# Patient Record
Sex: MALE | Race: WHITE | Employment: OTHER | URBAN - METROPOLITAN AREA
[De-identification: names, ages, dates, MRNs, and addresses within clinical notes are randomized per-mention and may not be internally consistent; named-entity substitution may affect disease eponyms.]

---

## 2017-03-10 ENCOUNTER — OFFICE VISIT (OUTPATIENT)
Dept: INTERNAL MEDICINE CLINIC | Facility: CLINIC | Age: 82
End: 2017-03-10

## 2017-03-10 VITALS
BODY MASS INDEX: 30.53 KG/M2 | DIASTOLIC BLOOD PRESSURE: 85 MMHG | HEIGHT: 66 IN | OXYGEN SATURATION: 98 % | WEIGHT: 190 LBS | TEMPERATURE: 98 F | SYSTOLIC BLOOD PRESSURE: 135 MMHG | HEART RATE: 57 BPM

## 2017-03-10 DIAGNOSIS — R05.9 COUGH: ICD-10-CM

## 2017-03-10 DIAGNOSIS — J20.9 ACUTE BRONCHITIS, UNSPECIFIED ORGANISM: Primary | ICD-10-CM

## 2017-03-10 PROCEDURE — 99213 OFFICE O/P EST LOW 20 MIN: CPT | Performed by: INTERNAL MEDICINE

## 2017-03-10 PROCEDURE — G0463 HOSPITAL OUTPT CLINIC VISIT: HCPCS | Performed by: INTERNAL MEDICINE

## 2017-03-10 RX ORDER — AZITHROMYCIN 250 MG/1
TABLET, FILM COATED ORAL
Qty: 6 TABLET | Refills: 0 | Status: SHIPPED | OUTPATIENT
Start: 2017-03-10 | End: 2017-08-08 | Stop reason: ALTCHOICE

## 2017-03-10 RX ORDER — BENZONATATE 100 MG/1
100 CAPSULE ORAL 3 TIMES DAILY PRN
Qty: 20 CAPSULE | Refills: 0 | Status: SHIPPED | OUTPATIENT
Start: 2017-03-10 | End: 2017-03-17

## 2017-03-10 NOTE — PROGRESS NOTES
HPI:    Patient ID: Carmen Simms is a 80year old male. HPI  Pt comes today with daughter with complaint of cough and phlegm for few days, no fever no chills, no sob, just hard time sleeping due to cough.      Review of Systems   Constitutional: Negative daily. Disp:  Rfl:    carvedilol (COREG) 6.25 MG Oral Tab Take  by mouth. Disp:  Rfl:    lisinopril (PRINIVIL,ZESTRIL) 40 MG Oral Tab Take  by mouth. Disp:  Rfl:    simvastatin (ZOCOR) 20 MG Oral Tab Take  by mouth.  Disp:  Rfl:      Allergies:No Known Dimas normal.   Nursing note and vitals reviewed.            ASSESSMENT/PLAN:   Acute bronchitis, unspecified organism  (primary encounter diagnosis)- will cover with ab, take as prescribed with food, let us know if not better or if worst   Cough- will add cough

## 2017-08-08 ENCOUNTER — OFFICE VISIT (OUTPATIENT)
Dept: INTERNAL MEDICINE CLINIC | Facility: CLINIC | Age: 82
End: 2017-08-08

## 2017-08-08 VITALS
WEIGHT: 189 LBS | BODY MASS INDEX: 30.37 KG/M2 | HEIGHT: 66 IN | TEMPERATURE: 98 F | SYSTOLIC BLOOD PRESSURE: 100 MMHG | OXYGEN SATURATION: 96 % | HEART RATE: 62 BPM | DIASTOLIC BLOOD PRESSURE: 60 MMHG

## 2017-08-08 DIAGNOSIS — J20.9 ACUTE BRONCHITIS, UNSPECIFIED ORGANISM: Primary | ICD-10-CM

## 2017-08-08 DIAGNOSIS — I95.9 HYPOTENSION, UNSPECIFIED HYPOTENSION TYPE: ICD-10-CM

## 2017-08-08 DIAGNOSIS — R05.9 COUGH: ICD-10-CM

## 2017-08-08 PROCEDURE — 99214 OFFICE O/P EST MOD 30 MIN: CPT | Performed by: INTERNAL MEDICINE

## 2017-08-08 PROCEDURE — G0463 HOSPITAL OUTPT CLINIC VISIT: HCPCS | Performed by: INTERNAL MEDICINE

## 2017-08-08 RX ORDER — BENZONATATE 100 MG/1
100 CAPSULE ORAL 3 TIMES DAILY PRN
Qty: 20 CAPSULE | Refills: 0 | Status: SHIPPED | OUTPATIENT
Start: 2017-08-08 | End: 2017-08-15

## 2017-08-08 RX ORDER — AMOXICILLIN AND CLAVULANATE POTASSIUM 875; 125 MG/1; MG/1
1 TABLET, FILM COATED ORAL 2 TIMES DAILY
Qty: 20 TABLET | Refills: 0 | Status: SHIPPED | OUTPATIENT
Start: 2017-08-08 | End: 2017-08-18

## 2017-08-08 NOTE — PATIENT INSTRUCTIONS
ASSESSMENT/PLAN:   Acute bronchitis, unspecified organism  (primary encounter diagnosis)-we will treat with antibiotic take as prescribed drink plenty of fluid  Cough will give cough medicine also   Hypotension, unspecified hypotension typemost likely due

## 2017-08-08 NOTE — PROGRESS NOTES
HPI:    Patient ID: Dinora Munoz is a 80year old male. HPI   Comes in today with complaint of cough with phlegm mild shortness of breath with coughing feeling tired for about 5 days or so. Grandchildren was sick and thinks he got sick from them.     Re aspirin (ECOTRIN) 325 MG Oral Tab EC Take 325 mg by mouth daily. Disp:  Rfl:    carvedilol (COREG) 6.25 MG Oral Tab Take  by mouth. Disp:  Rfl:    lisinopril (PRINIVIL,ZESTRIL) 40 MG Oral Tab Take  by mouth.  Disp:  Rfl:    simvastatin (ZOCOR) 20 MG Oral He has a normal mood and affect. His behavior is normal. Judgment and thought content normal.   Nursing note and vitals reviewed.            ASSESSMENT/PLAN:   Acute bronchitis, unspecified organism  (primary encounter diagnosis)-we will treat with antibiot

## 2017-08-09 ENCOUNTER — TELEPHONE (OUTPATIENT)
Dept: INTERNAL MEDICINE CLINIC | Facility: CLINIC | Age: 82
End: 2017-08-09

## 2017-08-09 NOTE — TELEPHONE ENCOUNTER
Patient was seen yesterday daughter is asking if ok for patient to travel to [place] Monday 8-14 for 3 days it is a 4 hour drive. Patient also went on a 50 minute walk today and felt good just a little tired.

## 2017-08-09 NOTE — TELEPHONE ENCOUNTER
Daughter Tamra Kumari notified ok to go on drive to [place], should get out at least in 2 hours to stretch and walk around. Daughter agreed.

## 2017-11-14 ENCOUNTER — PRIOR ORIGINAL RECORDS (OUTPATIENT)
Dept: OTHER | Age: 82
End: 2017-11-14

## 2017-11-22 ENCOUNTER — LAB ENCOUNTER (OUTPATIENT)
Dept: LAB | Age: 82
End: 2017-11-22
Attending: INTERNAL MEDICINE
Payer: MEDICARE

## 2017-11-22 ENCOUNTER — PRIOR ORIGINAL RECORDS (OUTPATIENT)
Dept: OTHER | Age: 82
End: 2017-11-22

## 2017-11-22 DIAGNOSIS — E78.00 PURE HYPERCHOLESTEROLEMIA: ICD-10-CM

## 2017-11-22 DIAGNOSIS — I10 ESSENTIAL HYPERTENSION, MALIGNANT: Primary | ICD-10-CM

## 2017-11-22 LAB
ALT (SGPT): 14 U/L
AST (SGOT): 20 U/L
CHOLESTEROL, TOTAL: 162 MG/DL
HDL CHOLESTEROL: 52 MG/DL
LDL CHOLESTEROL: 90 MG/DL
TRIGLYCERIDES: 99 MG/DL

## 2017-11-22 PROCEDURE — 84460 ALANINE AMINO (ALT) (SGPT): CPT

## 2017-11-22 PROCEDURE — 36415 COLL VENOUS BLD VENIPUNCTURE: CPT

## 2017-11-22 PROCEDURE — 80061 LIPID PANEL: CPT

## 2017-11-22 PROCEDURE — 84450 TRANSFERASE (AST) (SGOT): CPT

## 2018-01-23 ENCOUNTER — TELEPHONE (OUTPATIENT)
Dept: INTERNAL MEDICINE CLINIC | Facility: CLINIC | Age: 83
End: 2018-01-23

## 2018-01-23 NOTE — TELEPHONE ENCOUNTER
Pt's daughter contacts clinic requesting appt. Pt felloout of bed 1 week ago and landed on his side. C/o rib/side/back pain. It is not severe but is ongoing. Denies CP, SOB or difficulty breathing. Pt is ambulating.   Acute visit booked 1/24, advised I

## 2018-01-24 ENCOUNTER — OFFICE VISIT (OUTPATIENT)
Dept: INTERNAL MEDICINE CLINIC | Facility: CLINIC | Age: 83
End: 2018-01-24

## 2018-01-24 ENCOUNTER — HOSPITAL ENCOUNTER (OUTPATIENT)
Dept: GENERAL RADIOLOGY | Facility: HOSPITAL | Age: 83
Discharge: HOME | End: 2018-01-24
Attending: INTERNAL MEDICINE
Payer: MEDICARE

## 2018-01-24 ENCOUNTER — HOSPITAL ENCOUNTER (OUTPATIENT)
Dept: GENERAL RADIOLOGY | Facility: HOSPITAL | Age: 83
Discharge: HOME | End: 2018-01-24
Attending: INTERNAL MEDICINE | Admitting: INTERNAL MEDICINE
Payer: MEDICARE

## 2018-01-24 ENCOUNTER — TELEPHONE (OUTPATIENT)
Dept: INTERNAL MEDICINE CLINIC | Facility: CLINIC | Age: 83
End: 2018-01-24

## 2018-01-24 VITALS
BODY MASS INDEX: 31.34 KG/M2 | TEMPERATURE: 99 F | SYSTOLIC BLOOD PRESSURE: 124 MMHG | WEIGHT: 195 LBS | HEIGHT: 66 IN | HEART RATE: 74 BPM | DIASTOLIC BLOOD PRESSURE: 70 MMHG | RESPIRATION RATE: 18 BRPM

## 2018-01-24 DIAGNOSIS — R07.81 RIB PAIN ON LEFT SIDE: ICD-10-CM

## 2018-01-24 DIAGNOSIS — R52 PAIN: ICD-10-CM

## 2018-01-24 DIAGNOSIS — R07.81 RIB PAIN ON LEFT SIDE: Primary | ICD-10-CM

## 2018-01-24 PROCEDURE — 71101 X-RAY EXAM UNILAT RIBS/CHEST: CPT | Performed by: INTERNAL MEDICINE

## 2018-01-24 PROCEDURE — 99213 OFFICE O/P EST LOW 20 MIN: CPT | Performed by: INTERNAL MEDICINE

## 2018-01-24 PROCEDURE — G0463 HOSPITAL OUTPT CLINIC VISIT: HCPCS | Performed by: INTERNAL MEDICINE

## 2018-01-24 PROCEDURE — 73010 X-RAY EXAM OF SHOULDER BLADE: CPT | Performed by: INTERNAL MEDICINE

## 2018-01-24 RX ORDER — ATORVASTATIN CALCIUM 20 MG/1
TABLET, FILM COATED ORAL
Refills: 3 | Status: ON HOLD | COMMUNITY
Start: 2018-01-15 | End: 2020-01-01

## 2018-01-24 RX ORDER — ACETAMINOPHEN AND CODEINE PHOSPHATE 300; 30 MG/1; MG/1
1 TABLET ORAL EVERY 4 HOURS PRN
Qty: 30 TABLET | Refills: 0 | Status: SHIPPED | OUTPATIENT
Start: 2018-01-24 | End: 2018-10-23 | Stop reason: ALTCHOICE

## 2018-01-24 NOTE — TELEPHONE ENCOUNTER
Patient called yesterday for patient feel 1 week ago and he has  Rib pain and back he thought the nurse put him on the schedule at 5 pm today can you see him or advice him to go to er . Patient and daughter waiting in the waiting room

## 2018-01-24 NOTE — PROGRESS NOTES
HPI:    Patient ID: Yehuda Mancera is a 80year old male. HPI to me complaining of the left-sided rib pain. According to him started 1 week ago after fall , from his bed he landed on his left side.   He states that the pain is getting worse now, he feels Psychiatric/Behavioral: Negative for agitation, behavioral problems, confusion, hallucinations and sleep disturbance. The patient is not nervous/anxious.             Current Outpatient Prescriptions:  atorvastatin 20 MG Oral Tab  Disp:  Rfl: 3   Acetaminoph Physical Exam   Musculoskeletal:        Left shoulder: He exhibits decreased range of motion.  He exhibits no tenderness, no bony tenderness, no swelling, no effusion, no crepitus, no deformity, no laceration, no pain, no spasm, normal pulse and normal str

## 2018-01-25 ENCOUNTER — HOSPITAL ENCOUNTER (OUTPATIENT)
Dept: GENERAL RADIOLOGY | Age: 83
Discharge: HOME | End: 2018-01-25
Attending: INTERNAL MEDICINE
Payer: MEDICARE

## 2018-01-25 ENCOUNTER — TELEPHONE (OUTPATIENT)
Dept: INTERNAL MEDICINE CLINIC | Facility: CLINIC | Age: 83
End: 2018-01-25
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
## 2018-01-25 PROCEDURE — 73030 X-RAY EXAM OF SHOULDER: CPT | Performed by: INTERNAL MEDICINE

## 2018-01-25 NOTE — TELEPHONE ENCOUNTER
Pt daughter states pt left shoulder feels a bit better, but still in pain 8/10. Discussed with Dr. Savannah Ayers directly, per Dr. Savannah Ayers pt should have Xr of shoulder done. Provided daughter with central scheduling phone number provided.  No further

## 2018-01-25 NOTE — PATIENT INSTRUCTIONS
Rib pain on left side  (primary encounter diagnosis) post fall most likely rib  Fracture, we will give Tylenol with codeine as the pain is severe, discussed about side effects, incentive spirometry 10 times within an hour to prevent atelectasis ,if not bet

## 2018-03-18 ENCOUNTER — APPOINTMENT (OUTPATIENT)
Dept: CT IMAGING | Facility: HOSPITAL | Age: 83
End: 2018-03-18
Attending: EMERGENCY MEDICINE
Payer: MEDICARE

## 2018-03-18 ENCOUNTER — HOSPITAL ENCOUNTER (EMERGENCY)
Facility: HOSPITAL | Age: 83
Discharge: HOME | End: 2018-03-18
Attending: EMERGENCY MEDICINE
Payer: MEDICARE

## 2018-03-18 VITALS
SYSTOLIC BLOOD PRESSURE: 168 MMHG | TEMPERATURE: 97 F | DIASTOLIC BLOOD PRESSURE: 88 MMHG | OXYGEN SATURATION: 99 % | RESPIRATION RATE: 18 BRPM | HEART RATE: 61 BPM

## 2018-03-18 DIAGNOSIS — K02.9 PAIN DUE TO DENTAL CARIES: ICD-10-CM

## 2018-03-18 DIAGNOSIS — R42 DIZZINESS: Primary | ICD-10-CM

## 2018-03-18 LAB
ANION GAP SERPL CALC-SCNC: 8 MMOL/L (ref 0–18)
BASOPHILS # BLD: 0.1 K/UL (ref 0–0.2)
BASOPHILS NFR BLD: 1 %
BUN SERPL-MCNC: 13 MG/DL (ref 8–20)
BUN/CREAT SERPL: 13.5 (ref 10–20)
CALCIUM SERPL-MCNC: 9.1 MG/DL (ref 8.5–10.5)
CHLORIDE SERPL-SCNC: 101 MMOL/L (ref 95–110)
CO2 SERPL-SCNC: 27 MMOL/L (ref 22–32)
CREAT SERPL-MCNC: 0.96 MG/DL (ref 0.5–1.5)
EOSINOPHIL # BLD: 0.2 K/UL (ref 0–0.7)
EOSINOPHIL NFR BLD: 2 %
ERYTHROCYTE [DISTWIDTH] IN BLOOD BY AUTOMATED COUNT: 13.8 % (ref 11–15)
GLUCOSE SERPL-MCNC: 144 MG/DL (ref 70–99)
HCT VFR BLD AUTO: 42.1 % (ref 41–52)
HGB BLD-MCNC: 14.5 G/DL (ref 13.5–17.5)
LYMPHOCYTES # BLD: 1.8 K/UL (ref 1–4)
LYMPHOCYTES NFR BLD: 23 %
MCH RBC QN AUTO: 32.4 PG (ref 27–32)
MCHC RBC AUTO-ENTMCNC: 34.6 G/DL (ref 32–37)
MCV RBC AUTO: 93.7 FL (ref 80–100)
MONOCYTES # BLD: 0.6 K/UL (ref 0–1)
MONOCYTES NFR BLD: 8 %
NEUTROPHILS # BLD AUTO: 5.1 K/UL (ref 1.8–7.7)
NEUTROPHILS NFR BLD: 66 %
OSMOLALITY UR CALC.SUM OF ELEC: 285 MOSM/KG (ref 275–295)
PLATELET # BLD AUTO: 132 K/UL (ref 140–400)
PMV BLD AUTO: 10 FL (ref 7.4–10.3)
POTASSIUM SERPL-SCNC: 4.6 MMOL/L (ref 3.3–5.1)
RBC # BLD AUTO: 4.49 M/UL (ref 4.5–5.9)
SODIUM SERPL-SCNC: 136 MMOL/L (ref 136–144)
TROPONIN I SERPL-MCNC: 0.03 NG/ML (ref ?–0.03)
WBC # BLD AUTO: 7.8 K/UL (ref 4–11)

## 2018-03-18 PROCEDURE — 70450 CT HEAD/BRAIN W/O DYE: CPT | Performed by: EMERGENCY MEDICINE

## 2018-03-18 PROCEDURE — 36415 COLL VENOUS BLD VENIPUNCTURE: CPT

## 2018-03-18 PROCEDURE — 84484 ASSAY OF TROPONIN QUANT: CPT | Performed by: EMERGENCY MEDICINE

## 2018-03-18 PROCEDURE — 93010 ELECTROCARDIOGRAM REPORT: CPT | Performed by: EMERGENCY MEDICINE

## 2018-03-18 PROCEDURE — 80048 BASIC METABOLIC PNL TOTAL CA: CPT | Performed by: EMERGENCY MEDICINE

## 2018-03-18 PROCEDURE — 99285 EMERGENCY DEPT VISIT HI MDM: CPT

## 2018-03-18 PROCEDURE — 93005 ELECTROCARDIOGRAM TRACING: CPT

## 2018-03-18 PROCEDURE — 85025 COMPLETE CBC W/AUTO DIFF WBC: CPT | Performed by: EMERGENCY MEDICINE

## 2018-03-18 NOTE — ED PROVIDER NOTES
Patient Seen in: [facility] AND [facility] Emergency Department    History   Patient presents with:  Dizziness (neurologic)    Stated Complaint: dizziness    HPI    Patient presents with feeling dizzy.   He states when he is walking he feels a bit lightheaded bit Diabetes Mother    • Breast Cancer Daughter        Smoking status: Never Smoker                                                              Smokeless tobacco: Never Used                      Alcohol use: Yes           1.2 oz/week     Glasses of wine: 2 pe tone.  Skin:  Warm, dry, well perfused. Good skin turgor. No rashes seen. Neurology:  Moving all extremities equally with good coordination. No cranial nerve asymmetry noted.   The pupils are equal reactive extract movements intact no nystagmus noted  P CULTURE REFLEX   RAINBOW DRAW BLUE   RAINBOW DRAW LAVENDER   RAINBOW DRAW DARK GREEN   RAINBOW DRAW LIGHT GREEN   RAINBOW DRAW GOLD   RAINBOW DRAW LAVENDER TALL (BNP)     EKG    Rate, intervals and axes as noted on EKG Report.   Rate: EKG shows rate 61 norm

## 2018-07-12 ENCOUNTER — PRIOR ORIGINAL RECORDS (OUTPATIENT)
Dept: OTHER | Age: 83
End: 2018-07-12

## 2018-07-12 ENCOUNTER — LAB ENCOUNTER (OUTPATIENT)
Dept: LAB | Age: 83
End: 2018-07-12
Attending: INTERNAL MEDICINE
Payer: MEDICARE

## 2018-07-12 DIAGNOSIS — E78.00 PURE HYPERCHOLESTEROLEMIA: Primary | ICD-10-CM

## 2018-07-12 LAB
ALT SERPL-CCNC: 20 U/L (ref 17–63)
AST SERPL-CCNC: 24 U/L (ref 15–41)
CHOLEST SERPL-MCNC: 120 MG/DL (ref 110–200)
HDLC SERPL-MCNC: 46 MG/DL
LDLC SERPL CALC-MCNC: 55 MG/DL (ref 0–99)
NONHDLC SERPL-MCNC: 74 MG/DL
TRIGL SERPL-MCNC: 97 MG/DL (ref 1–149)

## 2018-07-12 PROCEDURE — 84450 TRANSFERASE (AST) (SGOT): CPT

## 2018-07-12 PROCEDURE — 84460 ALANINE AMINO (ALT) (SGPT): CPT

## 2018-07-12 PROCEDURE — 80061 LIPID PANEL: CPT

## 2018-07-12 PROCEDURE — 36415 COLL VENOUS BLD VENIPUNCTURE: CPT

## 2018-07-13 ENCOUNTER — PRIOR ORIGINAL RECORDS (OUTPATIENT)
Dept: OTHER | Age: 83
End: 2018-07-13

## 2018-07-13 LAB
ALT (SGPT): 20 U/L
AST (SGOT): 24 U/L
CHOLESTEROL, TOTAL: 120 MG/DL
HDL CHOLESTEROL: 46 MG/DL
LDL CHOLESTEROL: 55 MG/DL
NON-HDL CHOLESTEROL: 74 MG/DL
TRIGLYCERIDES: 97 MG/DL

## 2018-10-20 ENCOUNTER — HOSPITAL ENCOUNTER (EMERGENCY)
Facility: HOSPITAL | Age: 83
Discharge: HOME | End: 2018-10-20
Payer: MEDICARE

## 2018-10-20 ENCOUNTER — APPOINTMENT (OUTPATIENT)
Dept: CT IMAGING | Facility: HOSPITAL | Age: 83
End: 2018-10-20
Payer: MEDICARE

## 2018-10-20 VITALS
SYSTOLIC BLOOD PRESSURE: 163 MMHG | DIASTOLIC BLOOD PRESSURE: 82 MMHG | TEMPERATURE: 98 F | HEART RATE: 68 BPM | OXYGEN SATURATION: 97 % | RESPIRATION RATE: 18 BRPM

## 2018-10-20 DIAGNOSIS — S01.511A LIP LACERATION, INITIAL ENCOUNTER: Primary | ICD-10-CM

## 2018-10-20 DIAGNOSIS — S09.93XA DENTAL INJURY, INITIAL ENCOUNTER: ICD-10-CM

## 2018-10-20 DIAGNOSIS — S03.2XXA TOOTH AVULSION, INITIAL ENCOUNTER: ICD-10-CM
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
## 2018-10-20 PROCEDURE — 70450 CT HEAD/BRAIN W/O DYE: CPT

## 2018-10-20 PROCEDURE — 12011 RPR F/E/E/N/L/M 2.5 CM/<: CPT

## 2018-10-20 PROCEDURE — 93005 ELECTROCARDIOGRAM TRACING: CPT

## 2018-10-20 PROCEDURE — 93010 ELECTROCARDIOGRAM REPORT: CPT | Performed by: INTERNAL MEDICINE

## 2018-10-20 PROCEDURE — 99284 EMERGENCY DEPT VISIT MOD MDM: CPT

## 2018-10-20 NOTE — ED PROVIDER NOTES
Patient Seen in: [facility] AND [facility] Emergency Department    History   Patient presents with:  Fall (musculoskeletal, neurologic)    Stated Complaint: fell, hit mouth    HPI    Patient is a 80-year-old male with some mild dementia who lives with his daught Physical Exam   Constitutional: He is oriented to person, place, and time. He appears well-developed and well-nourished. He is cooperative. HENT:   Head: Normocephalic.  Head is with laceration (Left upper lip with abrasion swelling and inner lip la Reading: LVH, no ST elevation              Laceration repair:    Verbal consent was obtained from the patient. The 2.5 cm laceration located inner upper lip was anesthetized in the usual fashion with 1% lidocaine without epi.  The wound was scrubbed, drape

## 2018-10-20 NOTE — ED NOTES
PT safe to DC home per MD. Alise Whitmore to dress self. DC teaching done, pt and daughter verbalize understanding. Ambulatory with steady gait to exit.

## 2018-10-23 ENCOUNTER — OFFICE VISIT (OUTPATIENT)
Dept: INTERNAL MEDICINE CLINIC | Facility: CLINIC | Age: 83
End: 2018-10-23
Payer: MEDICARE

## 2018-10-23 ENCOUNTER — MED REC SCAN ONLY (OUTPATIENT)
Dept: INTERNAL MEDICINE CLINIC | Facility: CLINIC | Age: 83
End: 2018-10-23

## 2018-10-23 VITALS
DIASTOLIC BLOOD PRESSURE: 68 MMHG | HEIGHT: 66 IN | HEART RATE: 52 BPM | SYSTOLIC BLOOD PRESSURE: 151 MMHG | BODY MASS INDEX: 29.89 KG/M2 | WEIGHT: 186 LBS

## 2018-10-23 DIAGNOSIS — Z23 NEED FOR INFLUENZA VACCINATION: ICD-10-CM

## 2018-10-23 DIAGNOSIS — I25.10 ATHEROSCLEROSIS OF NATIVE CORONARY ARTERY OF NATIVE HEART WITHOUT ANGINA PECTORIS: Primary | ICD-10-CM

## 2018-10-23 DIAGNOSIS — E78.00 HYPERCHOLESTEROLEMIA: ICD-10-CM

## 2018-10-23 DIAGNOSIS — I10 ESSENTIAL HYPERTENSION: ICD-10-CM
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
## 2018-10-23 PROCEDURE — 90653 IIV ADJUVANT VACCINE IM: CPT | Performed by: INTERNAL MEDICINE

## 2018-10-23 PROCEDURE — G0008 ADMIN INFLUENZA VIRUS VAC: HCPCS | Performed by: INTERNAL MEDICINE

## 2018-10-23 PROCEDURE — 99214 OFFICE O/P EST MOD 30 MIN: CPT | Performed by: INTERNAL MEDICINE

## 2018-10-23 NOTE — PATIENT INSTRUCTIONS
Take the medicines as prescribed. As we discussed please be cautious when you walk outside.   Avoid walking and slippery surface to prevent falls

## 2018-10-23 NOTE — PROGRESS NOTES
Niles Juarez is a 80year old male. Patient presents with:  ER F/U: pt fell down on Saturday  Imm/Inj: needs flu vax    HPI:   59-year-old [facility] male with a past medical history of coronary disease, hypertension, dyslipidemia here for evaluation after a f shortness of breath. Cardiovascular: Negative for chest pain. Gastrointestinal: Negative for vomiting, abdominal pain and abdominal distention. Genitourinary: Negative for hematuria. Skin: Negative for wound.    Psychiatric/Behavioral: Negative for influenza vaccination  -     FLU VACCINE ADJUVANT IM    Fall, subsequent encounter -discussed at length about fall precautions. Plan: Reviewed CT scan report and discussed the patient. I reviewed lab works and discussed with the patient.   Counseled mamie

## 2018-11-19 ENCOUNTER — IMAGING SERVICES (OUTPATIENT)
Dept: OTHER | Age: 83
End: 2018-11-19

## 2018-11-19 ENCOUNTER — HOSPITAL (OUTPATIENT)
Dept: OTHER | Age: 83
End: 2018-11-19
Attending: INTERNAL MEDICINE

## 2018-11-19 LAB
ANALYZER ANC (IANC): ABNORMAL
ANION GAP SERPL CALC-SCNC: 13 MMOL/L (ref 10–20)
APTT PPP: 24 SECONDS (ref 22–32)
APTT PPP: NORMAL S
BASOPHILS # BLD: 0 THOUSAND/MCL (ref 0–0.3)
BASOPHILS NFR BLD: 0 %
BUN SERPL-MCNC: 24 MG/DL (ref 6–20)
BUN/CREAT SERPL: 23 (ref 7–25)
CALCIUM SERPL-MCNC: 8.9 MG/DL (ref 8.4–10.2)
CHLORIDE: 106 MMOL/L (ref 98–107)
CO2 SERPL-SCNC: 26 MMOL/L (ref 21–32)
CREAT SERPL-MCNC: 1.06 MG/DL (ref 0.67–1.17)
DIFFERENTIAL METHOD BLD: ABNORMAL
EOSINOPHIL # BLD: 0.3 THOUSAND/MCL (ref 0.1–0.5)
EOSINOPHIL NFR BLD: 4 %
ERYTHROCYTE [DISTWIDTH] IN BLOOD: 13.1 % (ref 11–15)
GLUCOSE SERPL-MCNC: 192 MG/DL (ref 65–99)
HEMATOCRIT: 40.3 % (ref 39–51)
HGB BLD-MCNC: 13.2 GM/DL (ref 13–17)
INR PPP: 1.1
LYMPHOCYTES # BLD: 3.6 THOUSAND/MCL (ref 1–4)
LYMPHOCYTES NFR BLD: 51 %
MAGNESIUM SERPL-MCNC: 2 MG/DL (ref 1.7–2.4)
MCH RBC QN AUTO: 31.7 PG (ref 26–34)
MCHC RBC AUTO-ENTMCNC: 32.8 GM/DL (ref 32–36.5)
MCV RBC AUTO: 96.6 FL (ref 78–100)
MONOCYTES # BLD: 0.5 THOUSAND/MCL (ref 0.3–0.9)
MONOCYTES NFR BLD: 7 %
NEUTROPHILS # BLD: 2.6 THOUSAND/MCL (ref 1.8–7.7)
NEUTROPHILS NFR BLD: 38 %
NEUTS SEG NFR BLD: ABNORMAL %
NRBC (NRBCRE): ABNORMAL
NT-PROBNP SERPL-MCNC: 3699 PG/ML
PLATELET # BLD: 161 THOUSAND/MCL (ref 140–450)
POTASSIUM SERPL-SCNC: 4 MMOL/L (ref 3.4–5.1)
PROTHROMBIN TIME: 11.4 SECONDS (ref 9.7–11.8)
PROTHROMBIN TIME: NORMAL
RBC # BLD: 4.17 MILLION/MCL (ref 4.5–5.9)
SODIUM SERPL-SCNC: 141 MMOL/L (ref 135–145)
TROPONIN I SERPL HS-MCNC: 0.1 NG/ML
WBC # BLD: 7 THOUSAND/MCL (ref 4.2–11)

## 2018-11-20 ENCOUNTER — DIAGNOSTIC TRANS (OUTPATIENT)
Dept: OTHER | Age: 83
End: 2018-11-20

## 2018-11-20 LAB
ANALYZER ANC (IANC): ABNORMAL
ANION GAP SERPL CALC-SCNC: 15 MMOL/L (ref 10–20)
APTT PPP: 40 SECONDS (ref 22–32)
APTT PPP: 52 SECONDS (ref 22–32)
APTT PPP: ABNORMAL S
APTT PPP: ABNORMAL S
BASOPHILS # BLD: 0 THOUSAND/MCL (ref 0–0.3)
BASOPHILS NFR BLD: 0 %
BUN SERPL-MCNC: 20 MG/DL (ref 6–20)
BUN/CREAT SERPL: 20 (ref 7–25)
CALCIUM SERPL-MCNC: 8.6 MG/DL (ref 8.4–10.2)
CHLORIDE: 106 MMOL/L (ref 98–107)
CHOLEST SERPL-MCNC: 99 MG/DL
CHOLEST/HDLC SERPL: 1.9 {RATIO}
CO2 SERPL-SCNC: 25 MMOL/L (ref 21–32)
CREAT SERPL-MCNC: 0.99 MG/DL (ref 0.67–1.17)
DIFFERENTIAL METHOD BLD: ABNORMAL
EOSINOPHIL # BLD: 0.2 THOUSAND/MCL (ref 0.1–0.5)
EOSINOPHIL NFR BLD: 3 %
ERYTHROCYTE [DISTWIDTH] IN BLOOD: 13 % (ref 11–15)
GLUCOSE SERPL-MCNC: 122 MG/DL (ref 65–99)
HDLC SERPL-MCNC: 53 MG/DL
HEMATOCRIT: 35.8 % (ref 39–51)
HGB BLD-MCNC: 11.8 GM/DL (ref 13–17)
LDLC SERPL CALC-MCNC: 37 MG/DL
LYMPHOCYTES # BLD: 1.9 THOUSAND/MCL (ref 1–4)
LYMPHOCYTES NFR BLD: 36 %
MCH RBC QN AUTO: 31.3 PG (ref 26–34)
MCHC RBC AUTO-ENTMCNC: 33 GM/DL (ref 32–36.5)
MCV RBC AUTO: 95 FL (ref 78–100)
MONOCYTES # BLD: 0.4 THOUSAND/MCL (ref 0.3–0.9)
MONOCYTES NFR BLD: 8 %
NEUTROPHILS # BLD: 2.9 THOUSAND/MCL (ref 1.8–7.7)
NEUTROPHILS NFR BLD: 53 %
NEUTS SEG NFR BLD: ABNORMAL %
NONHDLC SERPL-MCNC: 46 MG/DL
NRBC (NRBCRE): ABNORMAL
PLATELET # BLD: 108 THOUSAND/MCL (ref 140–450)
POTASSIUM SERPL-SCNC: 4.7 MMOL/L (ref 3.4–5.1)
RBC # BLD: 3.77 MILLION/MCL (ref 4.5–5.9)
SODIUM SERPL-SCNC: 141 MMOL/L (ref 135–145)
TRIGLYCERIDE (TRIGP): 44 MG/DL
TROPONIN I SERPL HS-MCNC: 8.35 NG/ML
WBC # BLD: 5.4 THOUSAND/MCL (ref 4.2–11)

## 2018-11-21 LAB
ANALYZER ANC (IANC): ABNORMAL
ANION GAP SERPL CALC-SCNC: 12 MMOL/L (ref 10–20)
APTT PPP: 45 SECONDS (ref 22–32)
APTT PPP: ABNORMAL S
BASOPHILS # BLD: 0 THOUSAND/MCL (ref 0–0.3)
BASOPHILS NFR BLD: 0 %
BUN SERPL-MCNC: 17 MG/DL (ref 6–20)
BUN/CREAT SERPL: 17 (ref 7–25)
CALCIUM SERPL-MCNC: 8.8 MG/DL (ref 8.4–10.2)
CHLORIDE: 105 MMOL/L (ref 98–107)
CO2 SERPL-SCNC: 26 MMOL/L (ref 21–32)
CREAT SERPL-MCNC: 0.99 MG/DL (ref 0.67–1.17)
DIFFERENTIAL METHOD BLD: ABNORMAL
EOSINOPHIL # BLD: 0.2 THOUSAND/MCL (ref 0.1–0.5)
EOSINOPHIL NFR BLD: 4 %
ERYTHROCYTE [DISTWIDTH] IN BLOOD: 13.3 % (ref 11–15)
GLUCOSE SERPL-MCNC: 124 MG/DL (ref 65–99)
HEMATOCRIT: 38 % (ref 39–51)
HGB BLD-MCNC: 12.2 GM/DL (ref 13–17)
INR PPP: 1.1
LYMPHOCYTES # BLD: 2.1 THOUSAND/MCL (ref 1–4)
LYMPHOCYTES NFR BLD: 32 %
MAGNESIUM SERPL-MCNC: 1.8 MG/DL (ref 1.7–2.4)
MCH RBC QN AUTO: 30.7 PG (ref 26–34)
MCHC RBC AUTO-ENTMCNC: 32.1 GM/DL (ref 32–36.5)
MCV RBC AUTO: 95.5 FL (ref 78–100)
MONOCYTES # BLD: 0.6 THOUSAND/MCL (ref 0.3–0.9)
MONOCYTES NFR BLD: 9 %
NEUTROPHILS # BLD: 3.7 THOUSAND/MCL (ref 1.8–7.7)
NEUTROPHILS NFR BLD: 55 %
NEUTS SEG NFR BLD: ABNORMAL %
NRBC (NRBCRE): ABNORMAL
PLATELET # BLD: 111 THOUSAND/MCL (ref 140–450)
POTASSIUM SERPL-SCNC: 4.1 MMOL/L (ref 3.4–5.1)
PROTHROMBIN TIME: 11.6 SECONDS (ref 9.7–11.8)
PROTHROMBIN TIME: NORMAL
RBC # BLD: 3.98 MILLION/MCL (ref 4.5–5.9)
SODIUM SERPL-SCNC: 139 MMOL/L (ref 135–145)
WBC # BLD: 6.6 THOUSAND/MCL (ref 4.2–11)

## 2018-11-22 LAB
INR PPP: 1.2
PROTHROMBIN TIME: 12.1 SECONDS (ref 9.7–11.8)
PROTHROMBIN TIME: ABNORMAL

## 2018-11-26 ENCOUNTER — OFFICE VISIT (OUTPATIENT)
Dept: INTERNAL MEDICINE CLINIC | Facility: CLINIC | Age: 83
End: 2018-11-26
Payer: MEDICARE

## 2018-11-26 VITALS
DIASTOLIC BLOOD PRESSURE: 68 MMHG | BODY MASS INDEX: 29.89 KG/M2 | HEIGHT: 66 IN | WEIGHT: 186 LBS | SYSTOLIC BLOOD PRESSURE: 120 MMHG | OXYGEN SATURATION: 98 % | HEART RATE: 60 BPM

## 2018-11-26 DIAGNOSIS — I25.10 ATHEROSCLEROSIS OF NATIVE CORONARY ARTERY OF NATIVE HEART WITHOUT ANGINA PECTORIS: ICD-10-CM

## 2018-11-26 DIAGNOSIS — I10 ESSENTIAL HYPERTENSION: ICD-10-CM

## 2018-11-26 DIAGNOSIS — I47.2 VENTRICULAR TACHYCARDIA (HCC): Primary | ICD-10-CM

## 2018-11-26 PROCEDURE — 1111F DSCHRG MED/CURRENT MED MERGE: CPT | Performed by: INTERNAL MEDICINE

## 2018-11-26 PROCEDURE — 99214 OFFICE O/P EST MOD 30 MIN: CPT | Performed by: INTERNAL MEDICINE

## 2018-11-26 RX ORDER — ASPIRIN 81 MG/1
81 TABLET, CHEWABLE ORAL ONCE
Status: DISCONTINUED | OUTPATIENT
Start: 2018-11-26 | End: 2020-01-01

## 2018-11-26 RX ORDER — FUROSEMIDE 20 MG/1
1 TABLET ORAL DAILY
Refills: 3 | COMMUNITY
Start: 2018-11-21 | End: 2019-04-19

## 2018-11-26 RX ORDER — CLOPIDOGREL BISULFATE 75 MG/1
1 TABLET ORAL DAILY
Refills: 3 | COMMUNITY
Start: 2018-11-21 | End: 2018-12-06

## 2018-11-26 RX ORDER — AMIODARONE HYDROCHLORIDE 400 MG/1
1 TABLET ORAL DAILY
Refills: 3 | COMMUNITY
Start: 2018-11-21 | End: 2018-12-06

## 2018-11-26 RX ORDER — SPIRONOLACTONE 25 MG/1
1 TABLET ORAL DAILY
Refills: 3 | COMMUNITY
Start: 2018-11-21 | End: 2019-03-13 | Stop reason: SINTOL

## 2018-11-27 ENCOUNTER — PRIOR ORIGINAL RECORDS (OUTPATIENT)
Dept: OTHER | Age: 83
End: 2018-11-27

## 2018-11-27 NOTE — PROGRESS NOTES
Luz Avery is a 80year old male. Patient presents with:  Hospital F/U: pt had heart attack last week monday     HPI:   80-year-old male with a past medical history of coronary disease status post PCI, hypertension, dyslipidemia here for follow-up after h Relation Age of Onset   • Cancer Father         stomach   • Diabetes Mother    • Breast Cancer Daughter       No Known Allergies     REVIEW OF SYSTEMS:     Review of Systems   Constitutional: Negative for activity change, appetite change and fever.    HENT: reportedly okay. Continue with aspirin Plavix beta-blocker ACE inhibitor and statins. 4.  Dilated cardiomyopathy-stable status post AICD placement    Dyslipidemia on statins        Plan: As above. To see cardiology.   I reviewed all the medication for

## 2018-11-28 ENCOUNTER — TELEPHONE (OUTPATIENT)
Dept: INTERNAL MEDICINE CLINIC | Facility: CLINIC | Age: 83
End: 2018-11-28

## 2018-11-28 NOTE — TELEPHONE ENCOUNTER
Daughter called. Patient c/o urinated in toilet and passed large amount of blood. Just urinated again and urine clear  Denies any pain or pain on urination. Asking what to do? Could new medications cause this?

## 2018-11-28 NOTE — TELEPHONE ENCOUNTER
Acute      Sadaf Suarez MD   You 51 minutes ago (1:47 PM)     Plavix can cause bleeding.  If he has recurrence of bleeding that needs to be evaluated.  Please let them know. Janel Bryson you

## 2018-11-28 NOTE — TELEPHONE ENCOUNTER
Daughter Dustin Chambers notified if bleeding again needs to go to ER to be evaluated. could be from plavix would need blood count. Daughter agreed.

## 2018-11-30 ENCOUNTER — HOSPITAL ENCOUNTER (EMERGENCY)
Facility: HOSPITAL | Age: 83
Discharge: HOME | End: 2018-11-30
Attending: EMERGENCY MEDICINE
Payer: MEDICARE

## 2018-11-30 ENCOUNTER — APPOINTMENT (OUTPATIENT)
Dept: CT IMAGING | Facility: HOSPITAL | Age: 83
End: 2018-11-30
Attending: EMERGENCY MEDICINE
Payer: MEDICARE

## 2018-11-30 ENCOUNTER — PRIOR ORIGINAL RECORDS (OUTPATIENT)
Dept: OTHER | Age: 83
End: 2018-11-30

## 2018-11-30 VITALS
TEMPERATURE: 97 F | SYSTOLIC BLOOD PRESSURE: 140 MMHG | OXYGEN SATURATION: 97 % | WEIGHT: 150 LBS | BODY MASS INDEX: 22.22 KG/M2 | HEIGHT: 69 IN | HEART RATE: 65 BPM | RESPIRATION RATE: 19 BRPM | DIASTOLIC BLOOD PRESSURE: 84 MMHG

## 2018-11-30 DIAGNOSIS — R31.9 URINARY TRACT INFECTION WITH HEMATURIA, SITE UNSPECIFIED: Primary | ICD-10-CM

## 2018-11-30 DIAGNOSIS — N39.0 URINARY TRACT INFECTION WITH HEMATURIA, SITE UNSPECIFIED: Primary | ICD-10-CM

## 2018-11-30 PROCEDURE — 87086 URINE CULTURE/COLONY COUNT: CPT | Performed by: EMERGENCY MEDICINE

## 2018-11-30 PROCEDURE — 81001 URINALYSIS AUTO W/SCOPE: CPT | Performed by: EMERGENCY MEDICINE

## 2018-11-30 PROCEDURE — 74176 CT ABD & PELVIS W/O CONTRAST: CPT | Performed by: EMERGENCY MEDICINE

## 2018-11-30 PROCEDURE — 85025 COMPLETE CBC W/AUTO DIFF WBC: CPT | Performed by: EMERGENCY MEDICINE

## 2018-11-30 PROCEDURE — 36415 COLL VENOUS BLD VENIPUNCTURE: CPT

## 2018-11-30 PROCEDURE — 99284 EMERGENCY DEPT VISIT MOD MDM: CPT

## 2018-11-30 PROCEDURE — 80048 BASIC METABOLIC PNL TOTAL CA: CPT | Performed by: EMERGENCY MEDICINE

## 2018-11-30 PROCEDURE — 87186 SC STD MICRODIL/AGAR DIL: CPT | Performed by: EMERGENCY MEDICINE

## 2018-11-30 PROCEDURE — 87088 URINE BACTERIA CULTURE: CPT | Performed by: EMERGENCY MEDICINE

## 2018-11-30 RX ORDER — CEPHALEXIN 500 MG/1
500 CAPSULE ORAL 2 TIMES DAILY
Qty: 20 CAPSULE | Refills: 0 | Status: SHIPPED | OUTPATIENT
Start: 2018-11-30 | End: 2018-12-10

## 2018-11-30 NOTE — ED PROVIDER NOTES
Patient Seen in: [facility] AND [facility] Emergency Department    History   Patient presents with:  Hematuria    Stated Complaint: urinating blood    HPI    63-year-old male with history of hypertension, coronary artery disease post myocardial infarction, angio retractions, lungs are clear to auscultation. Bruising to the anterior chest wall on the left overlying the recent surgical site.   Cardiovascular: regular rate and rhythm  Gastrointestinal:  abdomen is soft and non tender, no masses, bowel sounds normal. URINE CULTURE, ROUTINE              MDM   Patient stable throughout ED stay. Lab and CT results noted. Will treat for suspected urinary tract infection. A urine culture was sent from the ED.   Will discharge home with antibiotics and plans to follow-up

## 2018-12-04 LAB
BUN: 24 MG/DL
CALCIUM: 9.2 MG/DL
CHLORIDE: 103 MEQ/L
CREATININE, SERUM: 1.28 MG/DL
GLUCOSE: 123 MG/DL
POTASSIUM, SERUM: 4.6 MEQ/L
SODIUM: 136 MEQ/L

## 2018-12-05 ENCOUNTER — MYAURORA ACCOUNT LINK (OUTPATIENT)
Dept: OTHER | Age: 83
End: 2018-12-05

## 2018-12-05 ENCOUNTER — PRIOR ORIGINAL RECORDS (OUTPATIENT)
Dept: OTHER | Age: 83
End: 2018-12-05

## 2018-12-06 ENCOUNTER — OFFICE VISIT (OUTPATIENT)
Dept: INTERNAL MEDICINE CLINIC | Facility: CLINIC | Age: 83
End: 2018-12-06
Payer: MEDICARE

## 2018-12-06 VITALS
HEIGHT: 66 IN | DIASTOLIC BLOOD PRESSURE: 62 MMHG | OXYGEN SATURATION: 98 % | WEIGHT: 186 LBS | BODY MASS INDEX: 29.89 KG/M2 | SYSTOLIC BLOOD PRESSURE: 111 MMHG | HEART RATE: 60 BPM

## 2018-12-06 DIAGNOSIS — R31.9 URINARY TRACT INFECTION WITH HEMATURIA, SITE UNSPECIFIED: Primary | ICD-10-CM

## 2018-12-06 DIAGNOSIS — N39.0 URINARY TRACT INFECTION WITH HEMATURIA, SITE UNSPECIFIED: Primary | ICD-10-CM

## 2018-12-06 PROCEDURE — 99213 OFFICE O/P EST LOW 20 MIN: CPT | Performed by: INTERNAL MEDICINE

## 2018-12-06 RX ORDER — AMIODARONE HYDROCHLORIDE 200 MG/1
200 TABLET ORAL DAILY
Qty: 90 TABLET | Refills: 1 | Status: ON HOLD | COMMUNITY
Start: 2018-12-06 | End: 2020-01-01

## 2018-12-07 NOTE — PROGRESS NOTES
Janna Gallo is a 80year old male. Patient presents with:  ER F/U: follow-up, blood in urine    HPI:   70-year-old male with a history of ventricular tachycardia status post defibrillator was evaluated in the emergency room for hematuria.   Patient was foun congestion and voice change. Respiratory: Negative for cough and shortness of breath. Cardiovascular: Negative for chest pain. Gastrointestinal: Negative for vomiting, abdominal pain and abdominal distention.    Genitourinary: Negative for hematuria

## 2019-01-01 ENCOUNTER — APPOINTMENT (OUTPATIENT)
Dept: LAB | Age: 84
End: 2019-01-01
Attending: INTERNAL MEDICINE
Payer: MEDICARE

## 2019-01-01 ENCOUNTER — NURSE ONLY (OUTPATIENT)
Dept: INTERNAL MEDICINE CLINIC | Facility: CLINIC | Age: 84
End: 2019-01-01
Payer: MEDICARE

## 2019-01-01 ENCOUNTER — OFFICE VISIT (OUTPATIENT)
Dept: NEPHROLOGY | Facility: CLINIC | Age: 84
End: 2019-01-01
Payer: MEDICARE

## 2019-01-01 VITALS
TEMPERATURE: 98 F | HEIGHT: 65 IN | DIASTOLIC BLOOD PRESSURE: 48 MMHG | BODY MASS INDEX: 28.66 KG/M2 | HEART RATE: 59 BPM | SYSTOLIC BLOOD PRESSURE: 105 MMHG | WEIGHT: 172 LBS

## 2019-01-01 DIAGNOSIS — N18.30 CKD (CHRONIC KIDNEY DISEASE), STAGE III (HCC): Primary | ICD-10-CM

## 2019-01-01 DIAGNOSIS — N18.30 CKD (CHRONIC KIDNEY DISEASE), STAGE III (HCC): ICD-10-CM

## 2019-01-01 DIAGNOSIS — Z23 NEED FOR VACCINATION: ICD-10-CM

## 2019-01-01 PROCEDURE — 99213 OFFICE O/P EST LOW 20 MIN: CPT | Performed by: INTERNAL MEDICINE

## 2019-01-01 PROCEDURE — G0008 ADMIN INFLUENZA VIRUS VAC: HCPCS | Performed by: INTERNAL MEDICINE

## 2019-01-01 PROCEDURE — G0463 HOSPITAL OUTPT CLINIC VISIT: HCPCS | Performed by: INTERNAL MEDICINE

## 2019-01-01 PROCEDURE — 90662 IIV NO PRSV INCREASED AG IM: CPT | Performed by: INTERNAL MEDICINE

## 2019-01-01 PROCEDURE — 36415 COLL VENOUS BLD VENIPUNCTURE: CPT

## 2019-01-01 PROCEDURE — 80069 RENAL FUNCTION PANEL: CPT

## 2019-01-01 RX ORDER — LISINOPRIL 5 MG/1
TABLET ORAL
Qty: 90 TABLET | Refills: 3 | Status: ON HOLD | OUTPATIENT
Start: 2019-01-01 | End: 2020-01-01

## 2019-01-01 RX ORDER — LISINOPRIL 5 MG/1
TABLET ORAL
Qty: 90 TABLET | Refills: 0 | Status: SHIPPED | OUTPATIENT
Start: 2019-01-01 | End: 2019-01-01

## 2019-01-01 RX ORDER — FUROSEMIDE 20 MG/1
TABLET ORAL
Qty: 45 TABLET | Refills: 0 | Status: SHIPPED | OUTPATIENT
Start: 2019-01-01 | End: 2020-01-01

## 2019-01-07 ENCOUNTER — OFFICE VISIT (OUTPATIENT)
Dept: INTERNAL MEDICINE CLINIC | Facility: CLINIC | Age: 84
End: 2019-01-07
Payer: MEDICARE

## 2019-01-07 VITALS
WEIGHT: 170 LBS | SYSTOLIC BLOOD PRESSURE: 110 MMHG | HEIGHT: 65 IN | HEART RATE: 60 BPM | DIASTOLIC BLOOD PRESSURE: 67 MMHG | BODY MASS INDEX: 28.32 KG/M2

## 2019-01-07 DIAGNOSIS — I10 ESSENTIAL HYPERTENSION: ICD-10-CM

## 2019-01-07 DIAGNOSIS — E78.00 HYPERCHOLESTEROLEMIA: ICD-10-CM

## 2019-01-07 DIAGNOSIS — I25.10 ATHEROSCLEROSIS OF NATIVE CORONARY ARTERY OF NATIVE HEART WITHOUT ANGINA PECTORIS: Primary | ICD-10-CM

## 2019-01-07 DIAGNOSIS — I47.2 VENTRICULAR TACHYCARDIA (HCC): ICD-10-CM

## 2019-01-07 PROCEDURE — 99214 OFFICE O/P EST MOD 30 MIN: CPT | Performed by: INTERNAL MEDICINE

## 2019-01-07 NOTE — PROGRESS NOTES
Carmen Le is a 80year old male. Patient presents with:  Hypertension  And heart disease  HPI:   80-year-old male with a past medical history of VT status post AICD, nonobstructive CAD, hypertension here for follow-up.   Patient reports that he is doing w for vomiting, abdominal pain and abdominal distention. Genitourinary: Negative for hematuria. Skin: Negative for wound. Psychiatric/Behavioral: Negative for behavioral problems.      Wt Readings from Last 5 Encounters:  01/07/19 : 170 lb (77.1 kg)  12

## 2019-01-07 NOTE — PATIENT INSTRUCTIONS
Please take the medications as prescribed. Your cardiologist has ordered for an echocardiogram, please make sure that you do it in your convenience.   I will see you back in 2-3 months

## 2019-01-08 ENCOUNTER — TELEPHONE (OUTPATIENT)
Dept: INTERNAL MEDICINE CLINIC | Facility: CLINIC | Age: 84
End: 2019-01-08

## 2019-01-08 NOTE — TELEPHONE ENCOUNTER
Jori Payment from [facility] calling regarding a order faxed on dec for Dr Barbi Degroot to sign and fax back. I did told her to fax it again because we don't have order. They will fax order  again today.  She said

## 2019-02-06 ENCOUNTER — APPOINTMENT (OUTPATIENT)
Dept: GENERAL RADIOLOGY | Facility: HOSPITAL | Age: 84
End: 2019-02-06
Payer: MEDICARE

## 2019-02-06 ENCOUNTER — HOSPITAL ENCOUNTER (EMERGENCY)
Facility: HOSPITAL | Age: 84
Discharge: HOME | End: 2019-02-06
Payer: MEDICARE

## 2019-02-06 ENCOUNTER — TELEPHONE (OUTPATIENT)
Dept: INTERNAL MEDICINE CLINIC | Facility: CLINIC | Age: 84
End: 2019-02-06

## 2019-02-06 VITALS
DIASTOLIC BLOOD PRESSURE: 58 MMHG | BODY MASS INDEX: 25.73 KG/M2 | SYSTOLIC BLOOD PRESSURE: 148 MMHG | WEIGHT: 169.75 LBS | TEMPERATURE: 99 F | HEART RATE: 68 BPM | HEIGHT: 68 IN | OXYGEN SATURATION: 99 % | RESPIRATION RATE: 18 BRPM

## 2019-02-06 DIAGNOSIS — S22.41XA CLOSED FRACTURE OF MULTIPLE RIBS OF RIGHT SIDE, INITIAL ENCOUNTER: Primary | ICD-10-CM

## 2019-02-06 DIAGNOSIS — E86.0 DEHYDRATION: ICD-10-CM

## 2019-02-06 DIAGNOSIS — N28.9 RENAL INSUFFICIENCY: ICD-10-CM

## 2019-02-06 DIAGNOSIS — N30.00 ACUTE CYSTITIS WITHOUT HEMATURIA: ICD-10-CM

## 2019-02-06 LAB
ANION GAP SERPL CALC-SCNC: 11 MMOL/L (ref 0–18)
BASOPHILS # BLD AUTO: 0.02 X10(3) UL (ref 0–0.2)
BASOPHILS NFR BLD AUTO: 0.3 %
BILIRUB UR QL: NEGATIVE
BUN SERPL-MCNC: 43 MG/DL (ref 8–20)
BUN/CREAT SERPL: 22.3 (ref 10–20)
CALCIUM SERPL-MCNC: 9.3 MG/DL (ref 8.5–10.5)
CHLORIDE SERPL-SCNC: 104 MMOL/L (ref 95–110)
CLARITY UR: CLEAR
CO2 SERPL-SCNC: 22 MMOL/L (ref 22–32)
COLOR UR: YELLOW
CREAT SERPL-MCNC: 1.93 MG/DL (ref 0.5–1.5)
DEPRECATED RDW RBC AUTO: 47 FL (ref 35.1–46.3)
EOSINOPHIL # BLD AUTO: 0.09 X10(3) UL (ref 0–0.7)
EOSINOPHIL NFR BLD AUTO: 1.3 %
ERYTHROCYTE [DISTWIDTH] IN BLOOD BY AUTOMATED COUNT: 13.5 % (ref 11–15)
GLUCOSE SERPL-MCNC: 114 MG/DL (ref 70–99)
GLUCOSE UR-MCNC: NEGATIVE MG/DL
HCT VFR BLD AUTO: 36.5 % (ref 39–53)
HGB BLD-MCNC: 11.9 G/DL (ref 13–17.5)
HGB UR QL STRIP.AUTO: NEGATIVE
IMM GRANULOCYTES # BLD AUTO: 0.02 X10(3) UL (ref 0–1)
IMM GRANULOCYTES NFR BLD: 0.3 %
KETONES UR-MCNC: NEGATIVE MG/DL
LYMPHOCYTES # BLD AUTO: 1.84 X10(3) UL (ref 1–4)
LYMPHOCYTES NFR BLD AUTO: 26.8 %
MCH RBC QN AUTO: 31.2 PG (ref 26–34)
MCHC RBC AUTO-ENTMCNC: 32.6 G/DL (ref 31–37)
MCV RBC AUTO: 95.5 FL (ref 80–100)
MONOCYTES # BLD AUTO: 0.65 X10(3) UL (ref 0.1–1)
MONOCYTES NFR BLD AUTO: 9.5 %
NEUTROPHILS # BLD AUTO: 4.24 X10 (3) UL (ref 1.5–7.7)
NEUTROPHILS # BLD AUTO: 4.24 X10(3) UL (ref 1.5–7.7)
NEUTROPHILS NFR BLD AUTO: 61.8 %
NITRITE UR QL STRIP.AUTO: POSITIVE
OSMOLALITY UR CALC.SUM OF ELEC: 296 MOSM/KG (ref 275–295)
PH UR: 5 [PH] (ref 5–8)
PLATELET # BLD AUTO: 173 10(3)UL (ref 150–450)
POTASSIUM SERPL-SCNC: 5.3 MMOL/L (ref 3.3–5.1)
PROT UR-MCNC: NEGATIVE MG/DL
RBC # BLD AUTO: 3.82 X10(6)UL (ref 3.8–5.8)
RBC #/AREA URNS AUTO: 2 /HPF
SODIUM SERPL-SCNC: 137 MMOL/L (ref 136–144)
SP GR UR STRIP: 1.01 (ref 1–1.03)
UROBILINOGEN UR STRIP-ACNC: <2
VIT C UR-MCNC: NEGATIVE MG/DL
WBC # BLD AUTO: 6.9 X10(3) UL (ref 4–11)
WBC #/AREA URNS AUTO: 45 /HPF

## 2019-02-06 PROCEDURE — 87088 URINE BACTERIA CULTURE: CPT

## 2019-02-06 PROCEDURE — 99285 EMERGENCY DEPT VISIT HI MDM: CPT

## 2019-02-06 PROCEDURE — 81001 URINALYSIS AUTO W/SCOPE: CPT

## 2019-02-06 PROCEDURE — 71101 X-RAY EXAM UNILAT RIBS/CHEST: CPT

## 2019-02-06 PROCEDURE — 93010 ELECTROCARDIOGRAM REPORT: CPT | Performed by: INTERNAL MEDICINE

## 2019-02-06 PROCEDURE — 96361 HYDRATE IV INFUSION ADD-ON: CPT

## 2019-02-06 PROCEDURE — 87086 URINE CULTURE/COLONY COUNT: CPT

## 2019-02-06 PROCEDURE — 93005 ELECTROCARDIOGRAM TRACING: CPT

## 2019-02-06 PROCEDURE — 80048 BASIC METABOLIC PNL TOTAL CA: CPT

## 2019-02-06 PROCEDURE — 87186 SC STD MICRODIL/AGAR DIL: CPT

## 2019-02-06 PROCEDURE — 96360 HYDRATION IV INFUSION INIT: CPT

## 2019-02-06 PROCEDURE — 85025 COMPLETE CBC W/AUTO DIFF WBC: CPT

## 2019-02-06 RX ORDER — SULFAMETHOXAZOLE AND TRIMETHOPRIM 800; 160 MG/1; MG/1
1 TABLET ORAL 2 TIMES DAILY
Qty: 18 TABLET | Refills: 0 | Status: SHIPPED | OUTPATIENT
Start: 2019-02-06 | End: 2019-02-15

## 2019-02-06 RX ORDER — LEVOFLOXACIN 500 MG/1
500 TABLET, FILM COATED ORAL DAILY
Status: DISCONTINUED | OUTPATIENT
Start: 2019-02-06 | End: 2019-02-06

## 2019-02-06 RX ORDER — SULFAMETHOXAZOLE AND TRIMETHOPRIM 800; 160 MG/1; MG/1
1 TABLET ORAL ONCE
Status: COMPLETED | OUTPATIENT
Start: 2019-02-06 | End: 2019-02-06

## 2019-02-06 NOTE — TELEPHONE ENCOUNTER
Patients daughter Clarice Callahan called, patient fell last week x 2 and now he is all bruised on his side and in pain. Clarice Callahan is bringing patient to [facility]. Per Clarice Callahan, she is the nly one to receive information regarding her father.

## 2019-02-07 ENCOUNTER — APPOINTMENT (OUTPATIENT)
Dept: GENERAL RADIOLOGY | Facility: HOSPITAL | Age: 84
End: 2019-02-07
Attending: EMERGENCY MEDICINE
Payer: MEDICARE

## 2019-02-07 ENCOUNTER — APPOINTMENT (OUTPATIENT)
Dept: CT IMAGING | Facility: HOSPITAL | Age: 84
End: 2019-02-07
Attending: EMERGENCY MEDICINE
Payer: MEDICARE

## 2019-02-07 ENCOUNTER — HOSPITAL ENCOUNTER (EMERGENCY)
Facility: HOSPITAL | Age: 84
Discharge: HOME | End: 2019-02-07
Attending: EMERGENCY MEDICINE
Payer: MEDICARE

## 2019-02-07 VITALS
HEIGHT: 68 IN | RESPIRATION RATE: 18 BRPM | DIASTOLIC BLOOD PRESSURE: 62 MMHG | SYSTOLIC BLOOD PRESSURE: 136 MMHG | WEIGHT: 169.75 LBS | OXYGEN SATURATION: 94 % | BODY MASS INDEX: 25.73 KG/M2 | HEART RATE: 60 BPM | TEMPERATURE: 98 F

## 2019-02-07 DIAGNOSIS — S50.02XA CONTUSION OF LEFT ELBOW, INITIAL ENCOUNTER: ICD-10-CM

## 2019-02-07 DIAGNOSIS — S00.03XA CONTUSION OF SCALP, INITIAL ENCOUNTER: Primary | ICD-10-CM

## 2019-02-07 LAB
ANION GAP SERPL CALC-SCNC: 10 MMOL/L (ref 0–18)
BASOPHILS # BLD AUTO: 0.02 X10(3) UL (ref 0–0.2)
BASOPHILS NFR BLD AUTO: 0.4 %
BUN SERPL-MCNC: 42 MG/DL (ref 8–20)
BUN/CREAT SERPL: 20.9 (ref 10–20)
CALCIUM SERPL-MCNC: 8.7 MG/DL (ref 8.5–10.5)
CHLORIDE SERPL-SCNC: 104 MMOL/L (ref 95–110)
CO2 SERPL-SCNC: 20 MMOL/L (ref 22–32)
CREAT SERPL-MCNC: 2.01 MG/DL (ref 0.5–1.5)
DEPRECATED RDW RBC AUTO: 47.2 FL (ref 35.1–46.3)
EOSINOPHIL # BLD AUTO: 0.07 X10(3) UL (ref 0–0.7)
EOSINOPHIL NFR BLD AUTO: 1.2 %
ERYTHROCYTE [DISTWIDTH] IN BLOOD BY AUTOMATED COUNT: 13.4 % (ref 11–15)
GLUCOSE SERPL-MCNC: 114 MG/DL (ref 70–99)
HCT VFR BLD AUTO: 32.7 % (ref 39–53)
HGB BLD-MCNC: 10.6 G/DL (ref 13–17.5)
IMM GRANULOCYTES # BLD AUTO: 0.03 X10(3) UL (ref 0–1)
IMM GRANULOCYTES NFR BLD: 0.5 %
LYMPHOCYTES # BLD AUTO: 1.36 X10(3) UL (ref 1–4)
LYMPHOCYTES NFR BLD AUTO: 23.9 %
MCH RBC QN AUTO: 31.2 PG (ref 26–34)
MCHC RBC AUTO-ENTMCNC: 32.4 G/DL (ref 31–37)
MCV RBC AUTO: 96.2 FL (ref 80–100)
MONOCYTES # BLD AUTO: 0.39 X10(3) UL (ref 0.1–1)
MONOCYTES NFR BLD AUTO: 6.8 %
NEUTROPHILS # BLD AUTO: 3.83 X10 (3) UL (ref 1.5–7.7)
NEUTROPHILS # BLD AUTO: 3.83 X10(3) UL (ref 1.5–7.7)
NEUTROPHILS NFR BLD AUTO: 67.2 %
OSMOLALITY UR CALC.SUM OF ELEC: 289 MOSM/KG (ref 275–295)
PLATELET # BLD AUTO: 158 10(3)UL (ref 150–450)
POTASSIUM SERPL-SCNC: 5.4 MMOL/L (ref 3.3–5.1)
RBC # BLD AUTO: 3.4 X10(6)UL (ref 3.8–5.8)
SODIUM SERPL-SCNC: 134 MMOL/L (ref 136–144)
WBC # BLD AUTO: 5.7 X10(3) UL (ref 4–11)

## 2019-02-07 PROCEDURE — 73080 X-RAY EXAM OF ELBOW: CPT | Performed by: EMERGENCY MEDICINE

## 2019-02-07 PROCEDURE — 70450 CT HEAD/BRAIN W/O DYE: CPT | Performed by: EMERGENCY MEDICINE

## 2019-02-07 PROCEDURE — 99284 EMERGENCY DEPT VISIT MOD MDM: CPT

## 2019-02-07 PROCEDURE — 36415 COLL VENOUS BLD VENIPUNCTURE: CPT

## 2019-02-07 PROCEDURE — 80048 BASIC METABOLIC PNL TOTAL CA: CPT | Performed by: EMERGENCY MEDICINE

## 2019-02-07 PROCEDURE — 85025 COMPLETE CBC W/AUTO DIFF WBC: CPT | Performed by: EMERGENCY MEDICINE

## 2019-02-07 NOTE — ED PROVIDER NOTES
Patient Seen in: [facility] AND [facility] Emergency Department    History   Patient presents with:  Dizziness (neurologic)    Stated Complaint: dizzy, falling    HPI    Patient presents to the emergency department today with concerns of his daughter who he live Smokeless tobacco: Never Used    Alcohol use:  Yes      Alcohol/week: 1.2 oz      Types: 2 Glasses of wine per week    Drug use: No      Review of Systems  Constitutional: no fever, has otherwise been feeling well, normal appetite  HEENT: No cough, no conge equally with good coordination. No cranial nerve asymmetry noted. Pupils are equal reactive  Psychiatric:  Normal affect. He is fairly oriented but has some minor memory deficits which the daughter is helping him with. No unusual behavior.   Interactin Abnormality         Status                     ---------                               -----------         ------                     CBC W/ DIFFERENTIAL[[id number]]          Abnormal            Final result                 Please view results for th

## 2019-02-07 NOTE — ED INITIAL ASSESSMENT (HPI)
Dizziness and multiple falls over the last week or so. Bruising to right rib area. Pain with movement. Denies dizziness at this time.

## 2019-02-07 NOTE — ED INITIAL ASSESSMENT (HPI)
Witnessed fall at home pta. Found on floor by family. Hematoma to right scalp and abrasion to left elbow. Alert and answering questions appropriately at this time. Bleeding controlled at this time. Seen in er yesterday for another fall and discharged.

## 2019-02-07 NOTE — CM/SW NOTE
Met with patient and daughter at bedside for dc planning. Per Dr. Alex Dudley requesting to arrange for [facility]. This writer spoke with daughter, she states pt and wife live with her.   She states her mother is current with [facility] and would like that same agency for pa

## 2019-02-07 NOTE — ED NOTES
Pt to ER with c/o right mid back pain s/p fall on Friday. +bruising in different stages of healing noted to right mid lateral back. Pt c/o pain with inspiration. Pt on baby asa. Pt family states patient has had multiple falls recently.  Family states patien

## 2019-02-07 NOTE — ED PROVIDER NOTES
Patient Seen in: [facility] AND [facility] Emergency Department    History   Patient presents with:  Fall (musculoskeletal, neurologic)    Stated Complaint: fall, head injury    HPI    Patient complains of mechanical fall that occurred today.   Patient complains by mouth.        Family History   Problem Relation Age of Onset   • Cancer Father         stomach   • Diabetes Mother    • Breast Cancer Daughter        Social History    Tobacco Use      Smoking status: Never Smoker      Smokeless tobacco: Never Used    Al 2.01 (*)     BUN/CREA Ratio 20.9 (*)     GFR, Non- 28 (*)     GFR, -American 32 (*)     All other components within normal limits   CBC W/ DIFFERENTIAL - Abnormal; Notable for the following components:    RBC 3.40 (*)     HGB 10.6 Ruddy Mckeon Procedures:      Critical Care:      MDM   Case management appreciated, helping family with home health services            Disposition and Plan     Clinical Impression:  Contusion of scalp, initial encounter  (primary encounter diagnosis)  Contusion

## 2019-02-11 ENCOUNTER — OFFICE VISIT (OUTPATIENT)
Dept: INTERNAL MEDICINE CLINIC | Facility: CLINIC | Age: 84
End: 2019-02-11
Payer: MEDICARE

## 2019-02-11 VITALS
SYSTOLIC BLOOD PRESSURE: 117 MMHG | WEIGHT: 168 LBS | DIASTOLIC BLOOD PRESSURE: 68 MMHG | HEART RATE: 60 BPM | BODY MASS INDEX: 27.99 KG/M2 | HEIGHT: 65 IN

## 2019-02-11 DIAGNOSIS — N39.0 URINARY TRACT INFECTION WITH HEMATURIA, SITE UNSPECIFIED: Primary | ICD-10-CM

## 2019-02-11 DIAGNOSIS — R31.9 URINARY TRACT INFECTION WITH HEMATURIA, SITE UNSPECIFIED: Primary | ICD-10-CM

## 2019-02-11 DIAGNOSIS — I10 ESSENTIAL HYPERTENSION: ICD-10-CM

## 2019-02-11 DIAGNOSIS — N17.9 ACUTE RENAL FAILURE, UNSPECIFIED ACUTE RENAL FAILURE TYPE (HCC): ICD-10-CM

## 2019-02-11 DIAGNOSIS — S22.41XD CLOSED FRACTURE OF MULTIPLE RIBS OF RIGHT SIDE WITH ROUTINE HEALING: ICD-10-CM

## 2019-02-11 DIAGNOSIS — T14.8XXA HEMATOMA: ICD-10-CM

## 2019-02-11 LAB
ANION GAP SERPL CALC-SCNC: 8 MMOL/L (ref 0–18)
BUN SERPL-MCNC: 44 MG/DL (ref 8–20)
BUN/CREAT SERPL: 18.3 (ref 10–20)
CALCIUM SERPL-MCNC: 9.3 MG/DL (ref 8.5–10.5)
CHLORIDE SERPL-SCNC: 104 MMOL/L (ref 95–110)
CO2 SERPL-SCNC: 20 MMOL/L (ref 22–32)
CREAT SERPL-MCNC: 2.4 MG/DL (ref 0.5–1.5)
GLUCOSE SERPL-MCNC: 101 MG/DL (ref 70–99)
OSMOLALITY UR CALC.SUM OF ELEC: 285 MOSM/KG (ref 275–295)
POTASSIUM SERPL-SCNC: 5.6 MMOL/L (ref 3.3–5.1)
SODIUM SERPL-SCNC: 132 MMOL/L (ref 136–144)

## 2019-02-11 PROCEDURE — 99214 OFFICE O/P EST MOD 30 MIN: CPT | Performed by: INTERNAL MEDICINE

## 2019-02-11 NOTE — PROGRESS NOTES
Mansoor Merrill is a 80year old male. Patient presents with:  ER F/U: has been falling, 3 broken ribs, UTI, dehydration    HPI:   63-year-old gentleman here for follow-up. His daughter noticed him on the floor a week back. Taken to the emergency room.   Lorena Drug use: No     Family History   Problem Relation Age of Onset   • Cancer Father         stomach   • Diabetes Mother    • Breast Cancer Daughter       No Known Allergies     REVIEW OF SYSTEMS:     Review of Systems   Constitutional: Negative for activity hypertension  Well-controlled. If his potassium is high I will discontinue either Aldactone or lisinopril today. 3. Closed fracture of multiple ribs of right side with routine healing  Discussed fall precautions.   Instructed to use incentive spirometry

## 2019-02-11 NOTE — PATIENT INSTRUCTIONS
The hematoma in the head will continue to improve. For the rib fractures make sure that you do the breathing exercises. The bruises will get better in the next couple of weeks. His kidney function and potassium level is tiny bit in the higher side.   I w

## 2019-02-12 ENCOUNTER — TELEPHONE (OUTPATIENT)
Dept: INTERNAL MEDICINE CLINIC | Facility: CLINIC | Age: 84
End: 2019-02-12

## 2019-02-15 ENCOUNTER — OFFICE VISIT (OUTPATIENT)
Dept: INTERNAL MEDICINE CLINIC | Facility: CLINIC | Age: 84
End: 2019-02-15
Payer: MEDICARE

## 2019-02-15 VITALS
HEART RATE: 61 BPM | HEIGHT: 65 IN | SYSTOLIC BLOOD PRESSURE: 92 MMHG | WEIGHT: 168 LBS | DIASTOLIC BLOOD PRESSURE: 58 MMHG | BODY MASS INDEX: 27.99 KG/M2

## 2019-02-15 DIAGNOSIS — N17.9 ACUTE RENAL FAILURE, UNSPECIFIED ACUTE RENAL FAILURE TYPE (HCC): Primary | ICD-10-CM

## 2019-02-15 DIAGNOSIS — E87.5 HYPERKALEMIA: ICD-10-CM

## 2019-02-15 PROBLEM — R31.9 URINARY TRACT INFECTION WITH HEMATURIA: Status: RESOLVED | Noted: 2018-12-06 | Resolved: 2019-02-15

## 2019-02-15 PROBLEM — N39.0 URINARY TRACT INFECTION WITH HEMATURIA: Status: RESOLVED | Noted: 2018-12-06 | Resolved: 2019-02-15

## 2019-02-15 LAB
ANION GAP SERPL CALC-SCNC: 6 MMOL/L (ref 0–18)
BUN BLD-MCNC: 48 MG/DL (ref 7–18)
BUN/CREAT SERPL: 19.1 (ref 10–20)
CALCIUM BLD-MCNC: 8.7 MG/DL (ref 8.5–10.1)
CHLORIDE SERPL-SCNC: 108 MMOL/L (ref 98–107)
CO2 SERPL-SCNC: 22 MMOL/L (ref 21–32)
CREAT BLD-MCNC: 2.51 MG/DL (ref 0.7–1.3)
GLUCOSE BLD-MCNC: 162 MG/DL (ref 70–99)
OSMOLALITY SERPL CALC.SUM OF ELEC: 298 MOSM/KG (ref 275–295)
POTASSIUM SERPL-SCNC: 5.5 MMOL/L (ref 3.5–5.1)
SODIUM SERPL-SCNC: 136 MMOL/L (ref 136–145)

## 2019-02-15 PROCEDURE — 99213 OFFICE O/P EST LOW 20 MIN: CPT | Performed by: INTERNAL MEDICINE

## 2019-02-15 PROCEDURE — 36415 COLL VENOUS BLD VENIPUNCTURE: CPT | Performed by: INTERNAL MEDICINE

## 2019-02-15 NOTE — PROGRESS NOTES
Omkar Asencio is a 80year old male. Patient presents with:  Lab: needs repeat labs    HPI:   68-year-old gentleman here for follow-up. Denied any complaints today. His bruises are getting clearer. Denied any headache.   Denied any burning sensation with u pain.   Gastrointestinal: Negative for vomiting, abdominal pain and abdominal distention. Genitourinary: Negative for hematuria. Musculoskeletal:        Clearing bruises present in the right chest wall   Skin: Negative for wound.    Psychiatric/Behavior spirometry    4. Hematoma in the scalp. -Resolving    5. Hypertension -blood pressure is running low side now ACE inhibitors on hold, patient is a symptomatic  Plan: As above. I will see him back depending upon the blood test results.     The patient nathan

## 2019-02-16 ENCOUNTER — TELEPHONE (OUTPATIENT)
Dept: INTERNAL MEDICINE CLINIC | Facility: CLINIC | Age: 84
End: 2019-02-16

## 2019-02-16 NOTE — TELEPHONE ENCOUNTER
Message left for Dr. Anurag Gomez on his cell phone with BMP results for pt, advised to call me back if he has any questions.

## 2019-02-18 DIAGNOSIS — N17.9 ACUTE RENAL FAILURE, UNSPECIFIED ACUTE RENAL FAILURE TYPE (HCC): Primary | ICD-10-CM

## 2019-02-21 ENCOUNTER — HOSPITAL ENCOUNTER (OUTPATIENT)
Dept: ULTRASOUND IMAGING | Age: 84
Discharge: HOME | End: 2019-02-21
Attending: INTERNAL MEDICINE
Payer: MEDICARE

## 2019-02-21 DIAGNOSIS — N17.9 ACUTE RENAL FAILURE, UNSPECIFIED ACUTE RENAL FAILURE TYPE (HCC): ICD-10-CM

## 2019-02-21 PROCEDURE — 76775 US EXAM ABDO BACK WALL LIM: CPT | Performed by: INTERNAL MEDICINE

## 2019-02-22 ENCOUNTER — OFFICE VISIT (OUTPATIENT)
Dept: INTERNAL MEDICINE CLINIC | Facility: CLINIC | Age: 84
End: 2019-02-22
Payer: MEDICARE

## 2019-02-22 VITALS
DIASTOLIC BLOOD PRESSURE: 64 MMHG | SYSTOLIC BLOOD PRESSURE: 109 MMHG | BODY MASS INDEX: 26.33 KG/M2 | HEIGHT: 65 IN | WEIGHT: 158 LBS | HEART RATE: 60 BPM

## 2019-02-22 DIAGNOSIS — N17.9 ACUTE RENAL FAILURE, UNSPECIFIED ACUTE RENAL FAILURE TYPE (HCC): ICD-10-CM

## 2019-02-22 DIAGNOSIS — I10 ESSENTIAL HYPERTENSION: Primary | ICD-10-CM

## 2019-02-22 LAB
ANION GAP SERPL CALC-SCNC: 5 MMOL/L (ref 0–18)
BUN BLD-MCNC: 44 MG/DL (ref 7–18)
BUN/CREAT SERPL: 24 (ref 10–20)
CALCIUM BLD-MCNC: 9.3 MG/DL (ref 8.5–10.1)
CHLORIDE SERPL-SCNC: 107 MMOL/L (ref 98–107)
CO2 SERPL-SCNC: 24 MMOL/L (ref 21–32)
CREAT BLD-MCNC: 1.83 MG/DL (ref 0.7–1.3)
GLUCOSE BLD-MCNC: 123 MG/DL (ref 70–99)
OSMOLALITY SERPL CALC.SUM OF ELEC: 295 MOSM/KG (ref 275–295)
POTASSIUM SERPL-SCNC: 5.3 MMOL/L (ref 3.5–5.1)
SODIUM SERPL-SCNC: 136 MMOL/L (ref 136–145)

## 2019-02-22 PROCEDURE — 36415 COLL VENOUS BLD VENIPUNCTURE: CPT | Performed by: INTERNAL MEDICINE

## 2019-02-22 PROCEDURE — 99213 OFFICE O/P EST LOW 20 MIN: CPT | Performed by: INTERNAL MEDICINE

## 2019-02-22 NOTE — PROGRESS NOTES
Destiney Handy is a 80year old male. Patient presents with:  Test Results: had US yesterday  Lab: BMP, needs to review meds    HPI:   70-year-old gentleman with a past medical history of cardiac arrhythmias status post defibrillator, worsening renal function Types: 2 Glasses of wine per week    Drug use: No     Family History   Problem Relation Age of Onset   • Cancer Father         stomach   • Diabetes Mother    • Breast Cancer Daughter       No Known Allergies     REVIEW OF SYSTEMS:     Review of Systems continue to stop lisinopril. Instructed to stop Aldactone. To take Lasix every other day. 2. Essential hypertension  His blood pressure has been excellent even though we held lisinopril and Aldactone. - BASIC METABOLIC PANEL (8);  Future  - BASIC META

## 2019-02-22 NOTE — PATIENT INSTRUCTIONS
We are rechecking the blood test today and I will get back with you with the results either today or tomorrow morning. Please make sure you follow-up with the nephrologist as scheduled for Monday. Meanwhile please hold the lisinopril and Spironolactone.

## 2019-02-25 ENCOUNTER — OFFICE VISIT (OUTPATIENT)
Dept: NEPHROLOGY | Facility: CLINIC | Age: 84
End: 2019-02-25
Payer: MEDICARE

## 2019-02-25 VITALS
HEIGHT: 65 IN | HEART RATE: 59 BPM | TEMPERATURE: 98 F | DIASTOLIC BLOOD PRESSURE: 52 MMHG | WEIGHT: 159.63 LBS | BODY MASS INDEX: 26.6 KG/M2 | SYSTOLIC BLOOD PRESSURE: 114 MMHG

## 2019-02-25 DIAGNOSIS — N17.9 AKI (ACUTE KIDNEY INJURY) (HCC): Primary | ICD-10-CM

## 2019-02-25 DIAGNOSIS — E87.5 HYPERKALEMIA: ICD-10-CM

## 2019-02-25 PROCEDURE — 99205 OFFICE O/P NEW HI 60 MIN: CPT | Performed by: INTERNAL MEDICINE

## 2019-02-25 PROCEDURE — G0463 HOSPITAL OUTPT CLINIC VISIT: HCPCS | Performed by: INTERNAL MEDICINE

## 2019-02-25 NOTE — PROGRESS NOTES
Consult Requested By: Dr. Devin Gama    Reason for Consult: JUAN    HPI:     Patient is a 80 yrs old male with pmh of CAD s/p PCI, ischemic CMP/CHF, hearing loss, A-fib not on anticoagulation and s/p defibrillator in 11/2018 after cardiogenic shock status: Never Smoker      Smokeless tobacco: Never Used    Alcohol use:  Yes      Alcohol/week: 1.2 oz      Types: 2 Glasses of wine per week    Drug use: No       Medications (Active prior to today's visit):    Current Outpatient Medications:  aspirin 81 M membranes are moist   Neck/Thyroid: neck is supple without adenopathy  Lymphatic: no abnormal cervical, supraclavicular adenopathy is noted  Respiratory:  lungs are clear to auscultation bilaterally  Cardiovascular: regular rate and rhythm  Abdomen: soft,

## 2019-02-28 VITALS
BODY MASS INDEX: 28.95 KG/M2 | SYSTOLIC BLOOD PRESSURE: 116 MMHG | RESPIRATION RATE: 18 BRPM | DIASTOLIC BLOOD PRESSURE: 74 MMHG | WEIGHT: 191 LBS | HEART RATE: 73 BPM | HEIGHT: 68 IN

## 2019-02-28 VITALS
HEART RATE: 60 BPM | HEIGHT: 68 IN | RESPIRATION RATE: 16 BRPM | DIASTOLIC BLOOD PRESSURE: 60 MMHG | SYSTOLIC BLOOD PRESSURE: 110 MMHG

## 2019-02-28 VITALS
WEIGHT: 174 LBS | SYSTOLIC BLOOD PRESSURE: 90 MMHG | HEIGHT: 68 IN | RESPIRATION RATE: 16 BRPM | OXYGEN SATURATION: 96 % | HEART RATE: 60 BPM | DIASTOLIC BLOOD PRESSURE: 58 MMHG | BODY MASS INDEX: 26.37 KG/M2

## 2019-03-01 ENCOUNTER — APPOINTMENT (OUTPATIENT)
Dept: LAB | Age: 84
End: 2019-03-01
Attending: INTERNAL MEDICINE
Payer: MEDICARE

## 2019-03-01 DIAGNOSIS — N17.9 AKI (ACUTE KIDNEY INJURY) (HCC): ICD-10-CM

## 2019-03-01 LAB
ANION GAP SERPL CALC-SCNC: 6 MMOL/L (ref 0–18)
BUN BLD-MCNC: 32 MG/DL (ref 7–18)
BUN/CREAT SERPL: 20.6 (ref 10–20)
CALCIUM BLD-MCNC: 9 MG/DL (ref 8.5–10.1)
CHLORIDE SERPL-SCNC: 108 MMOL/L (ref 98–107)
CO2 SERPL-SCNC: 26 MMOL/L (ref 21–32)
CREAT BLD-MCNC: 1.55 MG/DL (ref 0.7–1.3)
GLUCOSE BLD-MCNC: 126 MG/DL (ref 70–99)
OSMOLALITY SERPL CALC.SUM OF ELEC: 298 MOSM/KG (ref 275–295)
POTASSIUM SERPL-SCNC: 4.6 MMOL/L (ref 3.5–5.1)
SODIUM SERPL-SCNC: 140 MMOL/L (ref 136–145)

## 2019-03-01 PROCEDURE — 36415 COLL VENOUS BLD VENIPUNCTURE: CPT

## 2019-03-01 PROCEDURE — 80048 BASIC METABOLIC PNL TOTAL CA: CPT

## 2019-03-04 ENCOUNTER — OFFICE VISIT (OUTPATIENT)
Dept: NEPHROLOGY | Facility: CLINIC | Age: 84
End: 2019-03-04
Payer: MEDICARE

## 2019-03-04 VITALS
DIASTOLIC BLOOD PRESSURE: 61 MMHG | BODY MASS INDEX: 26.93 KG/M2 | HEART RATE: 60 BPM | HEIGHT: 65 IN | SYSTOLIC BLOOD PRESSURE: 128 MMHG | TEMPERATURE: 97 F | WEIGHT: 161.63 LBS

## 2019-03-04 DIAGNOSIS — N17.9 AKI (ACUTE KIDNEY INJURY) (HCC): Primary | ICD-10-CM

## 2019-03-04 PROCEDURE — G0463 HOSPITAL OUTPT CLINIC VISIT: HCPCS | Performed by: INTERNAL MEDICINE

## 2019-03-04 PROCEDURE — 99214 OFFICE O/P EST MOD 30 MIN: CPT | Performed by: INTERNAL MEDICINE

## 2019-03-04 NOTE — PROGRESS NOTES
Progress Note:      Patient is a 80 yrs old male with pmh of CAD s/p PCI, ischemic CMP/CHF, hearing loss, A-fib not on anticoagulation and s/p defibrillator in 11/2018 after cardiogenic shock and EF declined from 40 -> 25%, HTN who presented for foll Social History    Tobacco Use      Smoking status: Never Smoker      Smokeless tobacco: Never Used    Alcohol use:  Yes      Alcohol/week: 1.2 oz      Types: 2 Glasses of wine per week    Drug use: No       Medications (Active prior to today's visit):    Cu motion is intact  Nose/Mouth/Throat:mucous membranes are moist   Neck/Thyroid: neck is supple without adenopathy  Lymphatic: no abnormal cervical, supraclavicular adenopathy is noted  Respiratory:  lungs are clear to auscultation bilaterally  Cardiovascula No prescriptions requested or ordered in this encounter       Imaging & Referrals:  None     3/4/2019  Hodan Anthony MD

## 2019-03-04 NOTE — PATIENT INSTRUCTIONS
Follow up on Wednesday 3/13   Blood and urine test prior to next week  Daily weights and monitor blood pressure at home

## 2019-03-06 RX ORDER — AMIODARONE HYDROCHLORIDE 200 MG/1
200 TABLET ORAL DAILY
COMMUNITY
End: 2019-12-02 | Stop reason: SDUPTHER

## 2019-03-06 RX ORDER — FUROSEMIDE 20 MG/1
1 TABLET ORAL EVERY OTHER DAY
COMMUNITY
Start: 2018-12-05

## 2019-03-06 RX ORDER — LISINOPRIL 40 MG/1
5 TABLET ORAL DAILY
COMMUNITY

## 2019-03-06 RX ORDER — SPIRONOLACTONE 25 MG/1
1 TABLET ORAL DAILY
COMMUNITY
Start: 2018-12-05 | End: 2019-03-13 | Stop reason: CLARIF

## 2019-03-06 RX ORDER — CARVEDILOL 6.25 MG/1
TABLET ORAL
COMMUNITY
Start: 2018-11-19 | End: 2019-03-06 | Stop reason: SDUPTHER

## 2019-03-06 RX ORDER — ATORVASTATIN CALCIUM 20 MG/1
TABLET, FILM COATED ORAL
COMMUNITY
Start: 2019-01-17 | End: 2019-10-20 | Stop reason: SDUPTHER

## 2019-03-07 RX ORDER — CARVEDILOL 6.25 MG/1
TABLET ORAL
Qty: 180 TABLET | Refills: 0 | Status: SHIPPED | OUTPATIENT
Start: 2019-03-07 | End: 2019-09-01 | Stop reason: SDUPTHER

## 2019-03-08 ENCOUNTER — ANCILLARY PROCEDURE (OUTPATIENT)
Dept: CARDIOLOGY | Age: 84
End: 2019-03-08
Attending: INTERNAL MEDICINE

## 2019-03-08 VITALS
HEART RATE: 60 BPM | BODY MASS INDEX: 24.81 KG/M2 | SYSTOLIC BLOOD PRESSURE: 116 MMHG | WEIGHT: 163.2 LBS | DIASTOLIC BLOOD PRESSURE: 60 MMHG

## 2019-03-08 DIAGNOSIS — I42.9 CARDIOMYOPATHY (CMD): ICD-10-CM

## 2019-03-08 DIAGNOSIS — Z45.018 PACEMAKER REPROGRAMMING/CHECK: ICD-10-CM

## 2019-03-08 PROCEDURE — 93283 PRGRMG EVAL IMPLANTABLE DFB: CPT | Performed by: INTERNAL MEDICINE

## 2019-03-08 PROCEDURE — 93306 TTE W/DOPPLER COMPLETE: CPT | Performed by: INTERNAL MEDICINE

## 2019-03-12 ENCOUNTER — APPOINTMENT (OUTPATIENT)
Dept: LAB | Age: 84
End: 2019-03-12
Attending: INTERNAL MEDICINE
Payer: MEDICARE

## 2019-03-12 DIAGNOSIS — N17.9 AKI (ACUTE KIDNEY INJURY) (HCC): ICD-10-CM

## 2019-03-12 PROBLEM — E78.1 HYPERGLYCERIDEMIA, PURE: Status: ACTIVE | Noted: 2019-03-12

## 2019-03-12 PROBLEM — I25.10 CAD (CORONARY ARTERY DISEASE): Status: ACTIVE | Noted: 2019-03-12

## 2019-03-12 PROBLEM — I50.22 CHRONIC SYSTOLIC HF (HEART FAILURE) (CMD): Status: ACTIVE | Noted: 2019-03-12

## 2019-03-12 PROBLEM — I10 HTN (HYPERTENSION), BENIGN: Status: ACTIVE | Noted: 2019-03-12

## 2019-03-12 PROBLEM — I25.5 ISCHEMIC CARDIOMYOPATHY: Status: ACTIVE | Noted: 2019-03-12

## 2019-03-12 LAB
ALBUMIN SERPL-MCNC: 3.1 G/DL (ref 3.4–5)
ANION GAP SERPL CALC-SCNC: 5 MMOL/L (ref 0–18)
BILIRUB UR QL: NEGATIVE
BUN BLD-MCNC: 25 MG/DL (ref 7–18)
BUN/CREAT SERPL: 18.5 (ref 10–20)
CALCIUM BLD-MCNC: 8.6 MG/DL (ref 8.5–10.1)
CHLORIDE SERPL-SCNC: 108 MMOL/L (ref 98–107)
CLARITY UR: CLEAR
CO2 SERPL-SCNC: 28 MMOL/L (ref 21–32)
COLOR UR: YELLOW
CREAT BLD-MCNC: 1.35 MG/DL (ref 0.7–1.3)
CREAT UR-SCNC: 35.9 MG/DL
GLUCOSE BLD-MCNC: 116 MG/DL (ref 70–99)
GLUCOSE UR-MCNC: NEGATIVE MG/DL
HGB UR QL STRIP.AUTO: NEGATIVE
KETONES UR-MCNC: NEGATIVE MG/DL
LEUKOCYTE ESTERASE UR QL STRIP.AUTO: NEGATIVE
MICROALBUMIN UR-MCNC: <0.5 MG/DL
NITRITE UR QL STRIP.AUTO: NEGATIVE
OSMOLALITY SERPL CALC.SUM OF ELEC: 297 MOSM/KG (ref 275–295)
PH UR: 5 [PH] (ref 5–8)
PHOSPHATE SERPL-MCNC: 4.3 MG/DL (ref 2.5–4.9)
POTASSIUM SERPL-SCNC: 4.5 MMOL/L (ref 3.5–5.1)
PROT UR-MCNC: <5 MG/DL
PROT UR-MCNC: NEGATIVE MG/DL
SODIUM SERPL-SCNC: 141 MMOL/L (ref 136–145)
SP GR UR STRIP: 1.01 (ref 1–1.03)
UROBILINOGEN UR STRIP-ACNC: <2
VIT C UR-MCNC: NEGATIVE MG/DL

## 2019-03-12 PROCEDURE — 93283 PRGRMG EVAL IMPLANTABLE DFB: CPT | Performed by: INTERNAL MEDICINE

## 2019-03-12 PROCEDURE — 84156 ASSAY OF PROTEIN URINE: CPT

## 2019-03-12 PROCEDURE — 81003 URINALYSIS AUTO W/O SCOPE: CPT

## 2019-03-12 PROCEDURE — 36415 COLL VENOUS BLD VENIPUNCTURE: CPT

## 2019-03-12 PROCEDURE — 82043 UR ALBUMIN QUANTITATIVE: CPT

## 2019-03-12 PROCEDURE — 80069 RENAL FUNCTION PANEL: CPT

## 2019-03-12 PROCEDURE — 82570 ASSAY OF URINE CREATININE: CPT

## 2019-03-13 ENCOUNTER — OFFICE VISIT (OUTPATIENT)
Dept: CARDIOLOGY | Age: 84
End: 2019-03-13

## 2019-03-13 ENCOUNTER — OFFICE VISIT (OUTPATIENT)
Dept: NEPHROLOGY | Facility: CLINIC | Age: 84
End: 2019-03-13
Payer: MEDICARE

## 2019-03-13 VITALS
DIASTOLIC BLOOD PRESSURE: 60 MMHG | WEIGHT: 165 LBS | HEIGHT: 65 IN | BODY MASS INDEX: 27.49 KG/M2 | SYSTOLIC BLOOD PRESSURE: 115 MMHG | HEART RATE: 59 BPM | TEMPERATURE: 97 F

## 2019-03-13 VITALS
HEIGHT: 65 IN | RESPIRATION RATE: 18 BRPM | DIASTOLIC BLOOD PRESSURE: 58 MMHG | WEIGHT: 164 LBS | SYSTOLIC BLOOD PRESSURE: 100 MMHG | HEART RATE: 60 BPM | OXYGEN SATURATION: 98 % | BODY MASS INDEX: 27.32 KG/M2

## 2019-03-13 DIAGNOSIS — I25.10 CORONARY ARTERY DISEASE INVOLVING NATIVE CORONARY ARTERY OF NATIVE HEART WITHOUT ANGINA PECTORIS: ICD-10-CM

## 2019-03-13 DIAGNOSIS — N17.9 AKI (ACUTE KIDNEY INJURY) (HCC): Primary | ICD-10-CM

## 2019-03-13 DIAGNOSIS — I25.5 ISCHEMIC CARDIOMYOPATHY: Primary | ICD-10-CM

## 2019-03-13 PROCEDURE — 99214 OFFICE O/P EST MOD 30 MIN: CPT | Performed by: INTERNAL MEDICINE

## 2019-03-13 PROCEDURE — G0463 HOSPITAL OUTPT CLINIC VISIT: HCPCS | Performed by: INTERNAL MEDICINE

## 2019-03-13 RX ORDER — LISINOPRIL 5 MG/1
5 TABLET ORAL DAILY
Qty: 90 TABLET | Refills: 0 | Status: SHIPPED | OUTPATIENT
Start: 2019-03-13 | End: 2019-06-14

## 2019-03-13 NOTE — PATIENT INSTRUCTIONS
Continue furosemide 20 mg every other day   Start lisinopril 5 mg daily and will titrate accordingly   Continue to hold spironolactone   Daily weights  Follow up in 8 weeks - blood test prior to next appointment

## 2019-03-13 NOTE — PROGRESS NOTES
Progress Note:      Patient is a 80 yrs old male with pmh of CAD s/p PCI, ischemic CMP/CHF, hearing loss, A-fib not on anticoagulation and s/p defibrillator in 11/2018 after cardiogenic shock and EF declined from 40 -> 25%, HTN who presented for follow Cancer Daughter       Social History: Social History    Tobacco Use      Smoking status: Never Smoker      Smokeless tobacco: Never Used    Alcohol use:  Yes      Alcohol/week: 1.2 oz      Types: 2 Glasses of wine per week    Drug use: No       Medications intact  Nose/Mouth/Throat:mucous membranes are moist   Neck/Thyroid: neck is supple without adenopathy  Lymphatic: no abnormal cervical, supraclavicular adenopathy is noted  Respiratory:  lungs are clear to auscultation bilaterally  Cardiovascular: regular Visit:  Requested Prescriptions     Signed Prescriptions Disp Refills   • lisinopril 5 MG Oral Tab 90 tablet 0     Sig: Take 1 tablet (5 mg total) by mouth daily.        Imaging & Referrals:  None     3/13/2019  Abraham Ford MD

## 2019-03-19 ENCOUNTER — TELEPHONE (OUTPATIENT)
Dept: INTERNAL MEDICINE CLINIC | Facility: CLINIC | Age: 84
End: 2019-03-19

## 2019-03-19 NOTE — TELEPHONE ENCOUNTER
Agus Davenport from [facility] ED called to inform Dr. Jeanie Back that as of today pt has been d/c from [facility].

## 2019-04-19 RX ORDER — FUROSEMIDE 20 MG/1
TABLET ORAL
Qty: 15 TABLET | Refills: 2 | Status: SHIPPED | OUTPATIENT
Start: 2019-04-19 | End: 2019-07-11

## 2019-04-19 NOTE — TELEPHONE ENCOUNTER
LOV 3/13/19. Directions for Lasix changed in med list to 20 mg take every other day per this office visit note. F/U in 8 weeks. Scheduled for 5/9/19. Refill pended and routed to Dr. Odalis Johnson.

## 2019-04-19 NOTE — TELEPHONE ENCOUNTER
Pts daughter/Krysta calling for pt to request script rx: furosemide, should be updated to take every other day.  Requesting to send to [facility]:[phone][facility] (for only this 1 time)    Current Outpatient Medications:   •

## 2019-05-07 ENCOUNTER — APPOINTMENT (OUTPATIENT)
Dept: LAB | Age: 84
End: 2019-05-07
Attending: INTERNAL MEDICINE
Payer: MEDICARE

## 2019-05-07 DIAGNOSIS — N17.9 AKI (ACUTE KIDNEY INJURY) (HCC): ICD-10-CM

## 2019-05-07 PROCEDURE — 80069 RENAL FUNCTION PANEL: CPT

## 2019-05-07 PROCEDURE — 36415 COLL VENOUS BLD VENIPUNCTURE: CPT

## 2019-05-09 ENCOUNTER — OFFICE VISIT (OUTPATIENT)
Dept: NEPHROLOGY | Facility: CLINIC | Age: 84
End: 2019-05-09
Payer: MEDICARE

## 2019-05-09 VITALS
BODY MASS INDEX: 29.17 KG/M2 | SYSTOLIC BLOOD PRESSURE: 145 MMHG | WEIGHT: 177.19 LBS | DIASTOLIC BLOOD PRESSURE: 72 MMHG | HEIGHT: 65.5 IN | TEMPERATURE: 97 F | HEART RATE: 59 BPM

## 2019-05-09 DIAGNOSIS — N18.30 CKD (CHRONIC KIDNEY DISEASE), STAGE III (HCC): ICD-10-CM

## 2019-05-09 DIAGNOSIS — N17.9 AKI (ACUTE KIDNEY INJURY) (HCC): Primary | ICD-10-CM

## 2019-05-09 PROCEDURE — 99214 OFFICE O/P EST MOD 30 MIN: CPT | Performed by: INTERNAL MEDICINE

## 2019-05-09 PROCEDURE — G0463 HOSPITAL OUTPT CLINIC VISIT: HCPCS | Performed by: INTERNAL MEDICINE

## 2019-05-09 NOTE — PATIENT INSTRUCTIONS
Take furosemide 20 mg daily for 4 days than can change to every other day   Fluid restriction at 1.5 liters a day   Low salt diet   Follow up in 3 months   Daily weights and watch leg swelling   Call in 2 weeks with home blood pressure readings

## 2019-05-09 NOTE — PROGRESS NOTES
Progress Note:      Patient is a 80 yrs old male with pmh of CAD s/p PCI, ischemic CMP/CHF, hearing loss, A-fib not on anticoagulation and s/p defibrillator in 11/2018 after cardiogenic shock and EF declined from 40 -> 25%, HTN who presented for follow Problem Relation Age of Onset   • Cancer Father         stomach   • Diabetes Mother    • Breast Cancer Daughter       Social History: Social History    Tobacco Use      Smoking status: Never Smoker      Smokeless tobacco: Never Used    Alcohol use:  Yes and responsive no acute distress noted  Head/Face: normocephalic  Eyes/Vision: normal extraocular motion is intact  Nose/Mouth/Throat:mucous membranes are moist   Neck/Thyroid: neck is supple without adenopathy  Lymphatic: no abnormal cervical, supraclavic encounter.       Meds This Visit:  Requested Prescriptions      No prescriptions requested or ordered in this encounter       Imaging & Referrals:  None     5/9/2019  Marcelino Stone MD

## 2019-06-05 ENCOUNTER — APPOINTMENT (OUTPATIENT)
Dept: CARDIOLOGY | Age: 84
End: 2019-06-05
Attending: INTERNAL MEDICINE

## 2019-06-14 RX ORDER — LISINOPRIL 5 MG/1
TABLET ORAL
Qty: 90 TABLET | Refills: 0 | Status: SHIPPED | OUTPATIENT
Start: 2019-06-14 | End: 2019-09-01

## 2019-07-09 ENCOUNTER — ANCILLARY PROCEDURE (OUTPATIENT)
Dept: CARDIOLOGY | Age: 84
End: 2019-07-09
Attending: INTERNAL MEDICINE

## 2019-07-09 ENCOUNTER — ANCILLARY ORDERS (OUTPATIENT)
Dept: CARDIOLOGY | Age: 84
End: 2019-07-09

## 2019-07-09 DIAGNOSIS — Z95.810 ICD (IMPLANTABLE CARDIOVERTER-DEFIBRILLATOR) IN PLACE: ICD-10-CM

## 2019-07-09 PROCEDURE — 93295 DEV INTERROG REMOTE 1/2/MLT: CPT | Performed by: INTERNAL MEDICINE

## 2019-07-11 RX ORDER — FUROSEMIDE 20 MG/1
TABLET ORAL
Qty: 15 TABLET | Refills: 2 | Status: SHIPPED | OUTPATIENT
Start: 2019-07-11 | End: 2019-09-01

## 2019-07-11 NOTE — TELEPHONE ENCOUNTER
LOV 5/9/19. RTC 3 months (8/9/19). Upcoming appt scheduled 8/26/19. No long-term changes to furosemide at LOV (pt was just instructed to take daily for 4 days and then go back to every other day).

## 2019-08-21 ENCOUNTER — TELEPHONE (OUTPATIENT)
Dept: NEPHROLOGY | Facility: CLINIC | Age: 84
End: 2019-08-21

## 2019-08-21 DIAGNOSIS — N18.30 CKD (CHRONIC KIDNEY DISEASE), STAGE III (HCC): Primary | ICD-10-CM

## 2019-08-21 NOTE — TELEPHONE ENCOUNTER
Encounter routed to Dr. Eugene Rogers to advise. There are no future orders in this chart from Dr. Eugene Rogers.

## 2019-08-23 ENCOUNTER — APPOINTMENT (OUTPATIENT)
Dept: LAB | Age: 84
End: 2019-08-23
Attending: INTERNAL MEDICINE
Payer: MEDICARE

## 2019-08-23 DIAGNOSIS — N18.30 CKD (CHRONIC KIDNEY DISEASE), STAGE III (HCC): ICD-10-CM

## 2019-08-23 LAB
ALBUMIN SERPL-MCNC: 3 G/DL
ALBUMIN SERPL-MCNC: 3 G/DL (ref 3.4–5)
ALBUMIN/GLOB SERPL: NORMAL {RATIO}
ANION GAP SERPL CALC-SCNC: 5 MMOL/L
ANION GAP SERPL CALC-SCNC: 5 MMOL/L (ref 0–18)
BUN BLD-MCNC: 32 MG/DL (ref 7–18)
BUN SERPL-MCNC: 32 MG/DL
BUN/CREAT SERPL: 23.9
BUN/CREAT SERPL: 23.9 (ref 10–20)
CALCIUM BLD-MCNC: 8.9 MG/DL (ref 8.5–10.1)
CALCIUM SERPL-MCNC: 8.9 MG/DL
CHLORIDE SERPL-SCNC: 110 MMOL/L
CHLORIDE SERPL-SCNC: 110 MMOL/L (ref 98–112)
CO2 SERPL-SCNC: 30 MMOL/L
CO2 SERPL-SCNC: 30 MMOL/L (ref 21–32)
CREAT BLD-MCNC: 1.34 MG/DL (ref 0.7–1.3)
CREAT SERPL-MCNC: 1.34 MG/DL
GLOBULIN SER-MCNC: NORMAL G/DL
GLUCOSE BLD-MCNC: 111 MG/DL (ref 70–99)
GLUCOSE SERPL-MCNC: 111 MG/DL
LENGTH OF FAST TIME PATIENT: NORMAL H
OSMOLALITY SERPL CALC.SUM OF ELEC: 308 MOSM/KG (ref 275–295)
PHOSPHATE SERPL-MCNC: 3.2 MG/DL
PHOSPHATE SERPL-MCNC: 3.2 MG/DL (ref 2.5–4.9)
POTASSIUM SERPL-SCNC: 4.4 MMOL/L
POTASSIUM SERPL-SCNC: 4.4 MMOL/L (ref 3.5–5.1)
SODIUM SERPL-SCNC: 145 MMOL/L
SODIUM SERPL-SCNC: 145 MMOL/L (ref 136–145)

## 2019-08-23 PROCEDURE — 80069 RENAL FUNCTION PANEL: CPT

## 2019-08-23 PROCEDURE — 36415 COLL VENOUS BLD VENIPUNCTURE: CPT

## 2019-08-26 ENCOUNTER — OFFICE VISIT (OUTPATIENT)
Dept: NEPHROLOGY | Facility: CLINIC | Age: 84
End: 2019-08-26
Payer: MEDICARE

## 2019-08-26 VITALS
DIASTOLIC BLOOD PRESSURE: 68 MMHG | HEART RATE: 60 BPM | WEIGHT: 172.81 LBS | TEMPERATURE: 98 F | BODY MASS INDEX: 28.79 KG/M2 | SYSTOLIC BLOOD PRESSURE: 122 MMHG | HEIGHT: 65 IN

## 2019-08-26 DIAGNOSIS — N18.30 CKD (CHRONIC KIDNEY DISEASE), STAGE III (HCC): Primary | ICD-10-CM

## 2019-08-26 PROCEDURE — G0463 HOSPITAL OUTPT CLINIC VISIT: HCPCS | Performed by: INTERNAL MEDICINE

## 2019-08-26 PROCEDURE — 99214 OFFICE O/P EST MOD 30 MIN: CPT | Performed by: INTERNAL MEDICINE

## 2019-08-26 NOTE — PROGRESS NOTES
Progress Note:      Patient is a 80 yrs old male with pmh of CAD s/p PCI, ischemic CMP/CHF, hearing loss, A-fib not on anticoagulation and s/p defibrillator in 11/2018 after cardiogenic shock and EF declined from 40 -> 25%, HTN who presented for follow Cancer Father         stomach   • Diabetes Mother    • Breast Cancer Daughter       Social History: Social History    Tobacco Use      Smoking status: Never Smoker      Smokeless tobacco: Never Used    Alcohol use:  Yes      Alcohol/week: 2.0 standard drink noted  Head/Face: normocephalic  Eyes/Vision: normal extraocular motion is intact  Nose/Mouth/Throat:mucous membranes are moist   Neck/Thyroid: neck is supple without adenopathy  Lymphatic: no abnormal cervical, supraclavicular adenopathy is noted  Respira

## 2019-08-26 NOTE — PATIENT INSTRUCTIONS
Follow up in 3 months  Monitor BP at home and call back in 4 weeks with home readings  Lab test as ordered

## 2019-09-03 RX ORDER — CARVEDILOL 6.25 MG/1
TABLET ORAL
Qty: 180 TABLET | Refills: 0 | Status: SHIPPED | OUTPATIENT
Start: 2019-09-03 | End: 2019-12-02 | Stop reason: SDUPTHER

## 2019-09-05 RX ORDER — FUROSEMIDE 20 MG/1
TABLET ORAL
Qty: 45 TABLET | Refills: 0 | Status: SHIPPED | OUTPATIENT
Start: 2019-09-05 | End: 2019-01-01

## 2019-09-05 RX ORDER — LISINOPRIL 5 MG/1
TABLET ORAL
Qty: 90 TABLET | Refills: 0 | Status: SHIPPED | OUTPATIENT
Start: 2019-09-05 | End: 2019-01-01

## 2019-09-11 ENCOUNTER — OFFICE VISIT (OUTPATIENT)
Dept: CARDIOLOGY | Age: 84
End: 2019-09-11

## 2019-09-11 VITALS
OXYGEN SATURATION: 98 % | SYSTOLIC BLOOD PRESSURE: 122 MMHG | HEART RATE: 61 BPM | BODY MASS INDEX: 28.32 KG/M2 | DIASTOLIC BLOOD PRESSURE: 62 MMHG | HEIGHT: 65 IN | WEIGHT: 170 LBS

## 2019-09-11 DIAGNOSIS — I25.5 ISCHEMIC CARDIOMYOPATHY: Primary | ICD-10-CM

## 2019-09-11 DIAGNOSIS — E78.1 HYPERGLYCERIDEMIA, PURE: ICD-10-CM

## 2019-09-11 PROCEDURE — 99214 OFFICE O/P EST MOD 30 MIN: CPT | Performed by: INTERNAL MEDICINE

## 2019-09-11 ASSESSMENT — PATIENT HEALTH QUESTIONNAIRE - PHQ9
2. FEELING DOWN, DEPRESSED OR HOPELESS: NOT AT ALL
1. LITTLE INTEREST OR PLEASURE IN DOING THINGS: NOT AT ALL
SUM OF ALL RESPONSES TO PHQ9 QUESTIONS 1 AND 2: 0
SUM OF ALL RESPONSES TO PHQ9 QUESTIONS 1 AND 2: 0

## 2019-10-16 ENCOUNTER — ANCILLARY PROCEDURE (OUTPATIENT)
Dept: CARDIOLOGY | Age: 84
End: 2019-10-16
Attending: INTERNAL MEDICINE

## 2019-10-16 ENCOUNTER — ANCILLARY ORDERS (OUTPATIENT)
Dept: CARDIOLOGY | Age: 84
End: 2019-10-16

## 2019-10-16 DIAGNOSIS — Z95.810 ICD (IMPLANTABLE CARDIOVERTER-DEFIBRILLATOR) IN PLACE: ICD-10-CM

## 2019-10-16 PROCEDURE — X1114 CARDIAC DEVICE HOME CHECK - REMOTE UNSCHEDULED: HCPCS | Performed by: INTERNAL MEDICINE

## 2019-10-16 PROCEDURE — 93295 DEV INTERROG REMOTE 1/2/MLT: CPT | Performed by: INTERNAL MEDICINE

## 2019-10-21 RX ORDER — ATORVASTATIN CALCIUM 20 MG/1
TABLET, FILM COATED ORAL
Qty: 90 TABLET | Refills: 0 | Status: SHIPPED | OUTPATIENT
Start: 2019-10-21 | End: 2020-02-27 | Stop reason: SDUPTHER

## 2019-10-28 RX ORDER — LISINOPRIL 40 MG/1
TABLET ORAL
Qty: 90 TABLET | Refills: 0 | OUTPATIENT
Start: 2019-10-28

## 2019-11-18 NOTE — PROGRESS NOTES
Progress Note:      Patient is a 80 yrs old male with pmh of CAD s/p PCI, ischemic CMP/CHF, hearing loss, A-fib not on anticoagulation and s/p defibrillator in 11/2018 after cardiogenic shock and EF declined from 40 -> 25%, HTN who presented for follow stomach   • Diabetes Mother    • Breast Cancer Daughter       Social History: Social History    Tobacco Use      Smoking status: Never Smoker      Smokeless tobacco: Never Used    Alcohol use:  Yes      Alcohol/week: 2.0 standard drinks      Types: 2 Gl extraocular motion is intact  Nose/Mouth/Throat:mucous membranes are moist   Neck/Thyroid: neck is supple without adenopathy  Lymphatic: no abnormal cervical, supraclavicular adenopathy is noted  Respiratory:  lungs are clear to auscultation bilaterally  C MD

## 2019-12-02 NOTE — TELEPHONE ENCOUNTER
LOV 11/18/19. Medication is noted as taking every other day. F/U in 3 mos (2/2020) Appt scheduled for 5/18/2020.  Refill pended and routed to Dr. Soto Rosales for approval.

## 2019-12-03 NOTE — TELEPHONE ENCOUNTER
LOV 11/18/19. RTC in 3 mos (3/2020) Medication is noted as taking.  Refill pended and routed to Dr. Aissatou Chaudhry for approval.

## 2019-12-04 DIAGNOSIS — Z79.899 MEDICATION MANAGEMENT: Primary | ICD-10-CM

## 2019-12-04 RX ORDER — CARVEDILOL 6.25 MG/1
TABLET ORAL
Qty: 180 TABLET | Refills: 2 | Status: SHIPPED | OUTPATIENT
Start: 2019-12-04 | End: 2020-02-25 | Stop reason: SDUPTHER

## 2019-12-04 RX ORDER — AMIODARONE HYDROCHLORIDE 200 MG/1
TABLET ORAL
Qty: 90 TABLET | Refills: 0 | Status: SHIPPED | OUTPATIENT
Start: 2019-12-04 | End: 2020-02-27 | Stop reason: SDUPTHER

## 2019-12-12 ENCOUNTER — TELEPHONE (OUTPATIENT)
Dept: CARDIOLOGY | Age: 84
End: 2019-12-12

## 2020-01-01 ENCOUNTER — TELEPHONE (OUTPATIENT)
Dept: INTERNAL MEDICINE CLINIC | Facility: CLINIC | Age: 85
End: 2020-01-01

## 2020-01-01 ENCOUNTER — TELEPHONE (OUTPATIENT)
Dept: NEPHROLOGY | Facility: CLINIC | Age: 85
End: 2020-01-01

## 2020-01-01 ENCOUNTER — OFFICE VISIT (OUTPATIENT)
Dept: INTERNAL MEDICINE CLINIC | Facility: CLINIC | Age: 85
End: 2020-01-01
Payer: MEDICARE

## 2020-01-01 ENCOUNTER — TELEPHONE (OUTPATIENT)
Dept: ENDOCRINOLOGY CLINIC | Facility: CLINIC | Age: 85
End: 2020-01-01

## 2020-01-01 ENCOUNTER — HOSPITAL ENCOUNTER (OUTPATIENT)
Age: 85
Discharge: HOME | End: 2020-01-01
Attending: EMERGENCY MEDICINE
Payer: MEDICARE

## 2020-01-01 ENCOUNTER — LAB ENCOUNTER (OUTPATIENT)
Dept: LAB | Facility: HOSPITAL | Age: 85
End: 2020-01-01
Attending: NURSE PRACTITIONER
Payer: MEDICARE

## 2020-01-01 ENCOUNTER — APPOINTMENT (OUTPATIENT)
Dept: ULTRASOUND IMAGING | Facility: HOSPITAL | Age: 85
DRG: 660 | End: 2020-01-01
Attending: INTERNAL MEDICINE
Payer: MEDICARE

## 2020-01-01 ENCOUNTER — APPOINTMENT (OUTPATIENT)
Dept: GENERAL RADIOLOGY | Age: 85
End: 2020-01-01
Attending: EMERGENCY MEDICINE
Payer: MEDICARE

## 2020-01-01 ENCOUNTER — OFFICE VISIT (OUTPATIENT)
Dept: ENDOCRINOLOGY CLINIC | Facility: CLINIC | Age: 85
End: 2020-01-01
Payer: MEDICARE

## 2020-01-01 ENCOUNTER — ANESTHESIA EVENT (OUTPATIENT)
Dept: SURGERY | Facility: HOSPITAL | Age: 85
DRG: 660 | End: 2020-01-01
Payer: MEDICARE

## 2020-01-01 ENCOUNTER — APPOINTMENT (OUTPATIENT)
Dept: LAB | Facility: HOSPITAL | Age: 85
End: 2020-01-01
Attending: INTERNAL MEDICINE
Payer: MEDICARE

## 2020-01-01 ENCOUNTER — APPOINTMENT (OUTPATIENT)
Dept: CT IMAGING | Facility: HOSPITAL | Age: 85
DRG: 683 | End: 2020-01-01
Attending: EMERGENCY MEDICINE
Payer: MEDICARE

## 2020-01-01 ENCOUNTER — LAB ENCOUNTER (OUTPATIENT)
Dept: LAB | Age: 85
End: 2020-01-01
Attending: INTERNAL MEDICINE
Payer: MEDICARE

## 2020-01-01 ENCOUNTER — MED REC SCAN ONLY (OUTPATIENT)
Dept: INTERNAL MEDICINE CLINIC | Facility: CLINIC | Age: 85
End: 2020-01-01

## 2020-01-01 ENCOUNTER — NURSE TRIAGE (OUTPATIENT)
Dept: INTERNAL MEDICINE CLINIC | Facility: CLINIC | Age: 85
End: 2020-01-01

## 2020-01-01 ENCOUNTER — HOSPITAL ENCOUNTER (EMERGENCY)
Facility: HOSPITAL | Age: 85
Discharge: HOME | End: 2020-01-01
Attending: EMERGENCY MEDICINE
Payer: MEDICARE

## 2020-01-01 ENCOUNTER — APPOINTMENT (OUTPATIENT)
Dept: CT IMAGING | Facility: HOSPITAL | Age: 85
DRG: 660 | End: 2020-01-01
Attending: EMERGENCY MEDICINE
Payer: MEDICARE

## 2020-01-01 ENCOUNTER — PROCEDURE (OUTPATIENT)
Dept: SURGERY | Facility: CLINIC | Age: 85
End: 2020-01-01
Payer: MEDICARE

## 2020-01-01 ENCOUNTER — OFFICE VISIT (OUTPATIENT)
Dept: NEPHROLOGY | Facility: CLINIC | Age: 85
End: 2020-01-01
Payer: MEDICARE

## 2020-01-01 ENCOUNTER — HOSPITAL ENCOUNTER (INPATIENT)
Facility: HOSPITAL | Age: 85
LOS: 5 days | Discharge: HOSPICE | DRG: 683 | End: 2020-01-01
Attending: EMERGENCY MEDICINE | Admitting: INTERNAL MEDICINE
Payer: MEDICARE

## 2020-01-01 ENCOUNTER — APPOINTMENT (OUTPATIENT)
Dept: GENERAL RADIOLOGY | Facility: HOSPITAL | Age: 85
End: 2020-01-01
Attending: EMERGENCY MEDICINE
Payer: MEDICARE

## 2020-01-01 ENCOUNTER — APPOINTMENT (OUTPATIENT)
Dept: LAB | Age: 85
End: 2020-01-01
Attending: INTERNAL MEDICINE
Payer: MEDICARE

## 2020-01-01 ENCOUNTER — VIRTUAL PHONE E/M (OUTPATIENT)
Dept: INTERNAL MEDICINE CLINIC | Facility: CLINIC | Age: 85
End: 2020-01-01
Payer: MEDICARE

## 2020-01-01 ENCOUNTER — TELEPHONE (OUTPATIENT)
Dept: GASTROENTEROLOGY | Facility: CLINIC | Age: 85
End: 2020-01-01

## 2020-01-01 ENCOUNTER — NURSE TRIAGE (OUTPATIENT)
Dept: OTHER | Age: 85
End: 2020-01-01

## 2020-01-01 ENCOUNTER — PATIENT OUTREACH (OUTPATIENT)
Dept: CASE MANAGEMENT | Age: 85
End: 2020-01-01

## 2020-01-01 ENCOUNTER — TELEPHONE (OUTPATIENT)
Dept: ORTHOPEDICS CLINIC | Facility: CLINIC | Age: 85
End: 2020-01-01

## 2020-01-01 ENCOUNTER — TELEPHONE (OUTPATIENT)
Dept: SURGERY | Facility: CLINIC | Age: 85
End: 2020-01-01

## 2020-01-01 ENCOUNTER — OFFICE VISIT (OUTPATIENT)
Dept: SURGERY | Facility: CLINIC | Age: 85
End: 2020-01-01
Payer: MEDICARE

## 2020-01-01 ENCOUNTER — APPOINTMENT (OUTPATIENT)
Dept: GENERAL RADIOLOGY | Facility: HOSPITAL | Age: 85
DRG: 660 | End: 2020-01-01
Attending: UROLOGY
Payer: MEDICARE

## 2020-01-01 ENCOUNTER — APPOINTMENT (OUTPATIENT)
Dept: ULTRASOUND IMAGING | Facility: HOSPITAL | Age: 85
DRG: 660 | End: 2020-01-01
Attending: EMERGENCY MEDICINE
Payer: MEDICARE

## 2020-01-01 ENCOUNTER — APPOINTMENT (OUTPATIENT)
Dept: GENERAL RADIOLOGY | Facility: HOSPITAL | Age: 85
DRG: 683 | End: 2020-01-01
Attending: EMERGENCY MEDICINE
Payer: MEDICARE

## 2020-01-01 ENCOUNTER — ANESTHESIA (OUTPATIENT)
Dept: SURGERY | Facility: HOSPITAL | Age: 85
DRG: 660 | End: 2020-01-01
Payer: MEDICARE

## 2020-01-01 ENCOUNTER — HOSPITAL ENCOUNTER (OUTPATIENT)
Dept: GENERAL RADIOLOGY | Age: 85
Discharge: HOME | End: 2020-01-01
Attending: UROLOGY
Payer: MEDICARE

## 2020-01-01 ENCOUNTER — OFFICE VISIT (OUTPATIENT)
Dept: GASTROENTEROLOGY | Facility: CLINIC | Age: 85
End: 2020-01-01
Payer: MEDICARE

## 2020-01-01 ENCOUNTER — OFFICE VISIT (OUTPATIENT)
Dept: PODIATRY CLINIC | Facility: CLINIC | Age: 85
End: 2020-01-01
Payer: MEDICARE

## 2020-01-01 ENCOUNTER — APPOINTMENT (OUTPATIENT)
Dept: GENERAL RADIOLOGY | Facility: HOSPITAL | Age: 85
DRG: 660 | End: 2020-01-01
Attending: EMERGENCY MEDICINE
Payer: MEDICARE

## 2020-01-01 ENCOUNTER — OFFICE VISIT (OUTPATIENT)
Dept: ORTHOPEDICS CLINIC | Facility: CLINIC | Age: 85
End: 2020-01-01
Payer: MEDICARE

## 2020-01-01 ENCOUNTER — HOSPITAL ENCOUNTER (INPATIENT)
Facility: HOSPITAL | Age: 85
LOS: 3 days | Discharge: HOME | DRG: 660 | End: 2020-01-01
Attending: EMERGENCY MEDICINE | Admitting: INTERNAL MEDICINE
Payer: MEDICARE

## 2020-01-01 ENCOUNTER — APPOINTMENT (OUTPATIENT)
Dept: GENERAL RADIOLOGY | Facility: HOSPITAL | Age: 85
End: 2020-01-01
Payer: MEDICARE

## 2020-01-01 VITALS
SYSTOLIC BLOOD PRESSURE: 130 MMHG | TEMPERATURE: 98 F | WEIGHT: 176 LBS | DIASTOLIC BLOOD PRESSURE: 74 MMHG | BODY MASS INDEX: 29 KG/M2 | HEART RATE: 60 BPM | OXYGEN SATURATION: 95 %

## 2020-01-01 VITALS
WEIGHT: 181 LBS | DIASTOLIC BLOOD PRESSURE: 77 MMHG | HEART RATE: 59 BPM | BODY MASS INDEX: 31 KG/M2 | SYSTOLIC BLOOD PRESSURE: 152 MMHG

## 2020-01-01 VITALS
WEIGHT: 178 LBS | DIASTOLIC BLOOD PRESSURE: 78 MMHG | BODY MASS INDEX: 30.39 KG/M2 | HEART RATE: 60 BPM | TEMPERATURE: 97 F | SYSTOLIC BLOOD PRESSURE: 143 MMHG | HEIGHT: 64 IN

## 2020-01-01 VITALS
DIASTOLIC BLOOD PRESSURE: 67 MMHG | TEMPERATURE: 97 F | WEIGHT: 173.63 LBS | HEIGHT: 64 IN | SYSTOLIC BLOOD PRESSURE: 123 MMHG | BODY MASS INDEX: 29.64 KG/M2 | HEART RATE: 61 BPM

## 2020-01-01 VITALS
DIASTOLIC BLOOD PRESSURE: 71 MMHG | HEIGHT: 64 IN | HEART RATE: 60 BPM | OXYGEN SATURATION: 97 % | SYSTOLIC BLOOD PRESSURE: 144 MMHG | BODY MASS INDEX: 29.71 KG/M2 | WEIGHT: 174 LBS

## 2020-01-01 VITALS
DIASTOLIC BLOOD PRESSURE: 79 MMHG | BODY MASS INDEX: 30 KG/M2 | SYSTOLIC BLOOD PRESSURE: 117 MMHG | HEART RATE: 60 BPM | WEIGHT: 176 LBS

## 2020-01-01 VITALS
WEIGHT: 184 LBS | HEART RATE: 60 BPM | SYSTOLIC BLOOD PRESSURE: 120 MMHG | DIASTOLIC BLOOD PRESSURE: 64 MMHG | BODY MASS INDEX: 32 KG/M2

## 2020-01-01 VITALS
OXYGEN SATURATION: 94 % | RESPIRATION RATE: 20 BRPM | DIASTOLIC BLOOD PRESSURE: 58 MMHG | BODY MASS INDEX: 29.69 KG/M2 | HEIGHT: 67 IN | TEMPERATURE: 98 F | HEART RATE: 60 BPM | WEIGHT: 189.13 LBS | SYSTOLIC BLOOD PRESSURE: 130 MMHG

## 2020-01-01 VITALS
OXYGEN SATURATION: 93 % | HEART RATE: 60 BPM | BODY MASS INDEX: 31.41 KG/M2 | TEMPERATURE: 98 F | WEIGHT: 184 LBS | HEIGHT: 64 IN | DIASTOLIC BLOOD PRESSURE: 75 MMHG | SYSTOLIC BLOOD PRESSURE: 135 MMHG

## 2020-01-01 VITALS
HEART RATE: 59 BPM | DIASTOLIC BLOOD PRESSURE: 70 MMHG | SYSTOLIC BLOOD PRESSURE: 138 MMHG | WEIGHT: 184.06 LBS | TEMPERATURE: 98 F | BODY MASS INDEX: 32 KG/M2 | RESPIRATION RATE: 16 BRPM | OXYGEN SATURATION: 97 %

## 2020-01-01 VITALS
SYSTOLIC BLOOD PRESSURE: 109 MMHG | BODY MASS INDEX: 31.41 KG/M2 | HEART RATE: 60 BPM | TEMPERATURE: 97 F | OXYGEN SATURATION: 94 % | DIASTOLIC BLOOD PRESSURE: 66 MMHG | WEIGHT: 184 LBS | HEIGHT: 64 IN

## 2020-01-01 VITALS
SYSTOLIC BLOOD PRESSURE: 127 MMHG | TEMPERATURE: 98 F | DIASTOLIC BLOOD PRESSURE: 64 MMHG | HEART RATE: 72 BPM | RESPIRATION RATE: 18 BRPM | OXYGEN SATURATION: 100 %

## 2020-01-01 VITALS
SYSTOLIC BLOOD PRESSURE: 104 MMHG | DIASTOLIC BLOOD PRESSURE: 57 MMHG | HEIGHT: 64 IN | BODY MASS INDEX: 30.16 KG/M2 | HEART RATE: 63 BPM | WEIGHT: 176.63 LBS | TEMPERATURE: 98 F

## 2020-01-01 VITALS
DIASTOLIC BLOOD PRESSURE: 61 MMHG | RESPIRATION RATE: 18 BRPM | BODY MASS INDEX: 29 KG/M2 | SYSTOLIC BLOOD PRESSURE: 134 MMHG | TEMPERATURE: 98 F | HEART RATE: 68 BPM | OXYGEN SATURATION: 98 % | WEIGHT: 176 LBS

## 2020-01-01 VITALS
SYSTOLIC BLOOD PRESSURE: 112 MMHG | DIASTOLIC BLOOD PRESSURE: 53 MMHG | RESPIRATION RATE: 19 BRPM | BODY MASS INDEX: 30 KG/M2 | TEMPERATURE: 99 F | OXYGEN SATURATION: 98 % | WEIGHT: 176 LBS | HEART RATE: 59 BPM

## 2020-01-01 VITALS — BODY MASS INDEX: 31.41 KG/M2 | HEIGHT: 64 IN | WEIGHT: 184 LBS

## 2020-01-01 VITALS
HEIGHT: 65 IN | BODY MASS INDEX: 29.82 KG/M2 | WEIGHT: 179 LBS | DIASTOLIC BLOOD PRESSURE: 71 MMHG | HEART RATE: 60 BPM | SYSTOLIC BLOOD PRESSURE: 136 MMHG

## 2020-01-01 VITALS
HEIGHT: 65 IN | HEART RATE: 62 BPM | SYSTOLIC BLOOD PRESSURE: 115 MMHG | DIASTOLIC BLOOD PRESSURE: 70 MMHG | OXYGEN SATURATION: 94 % | WEIGHT: 177 LBS | BODY MASS INDEX: 29.49 KG/M2 | TEMPERATURE: 97 F

## 2020-01-01 DIAGNOSIS — I10 ESSENTIAL HYPERTENSION: ICD-10-CM

## 2020-01-01 DIAGNOSIS — I42.0 DILATED CARDIOMYOPATHY (HCC): ICD-10-CM

## 2020-01-01 DIAGNOSIS — M21.612 BUNION, LEFT FOOT: ICD-10-CM

## 2020-01-01 DIAGNOSIS — I10 ESSENTIAL HYPERTENSION: Primary | ICD-10-CM

## 2020-01-01 DIAGNOSIS — N20.1 RIGHT URETERAL STONE: ICD-10-CM

## 2020-01-01 DIAGNOSIS — R11.14 BILIOUS VOMITING WITH NAUSEA: Primary | ICD-10-CM

## 2020-01-01 DIAGNOSIS — M19.131 POSTTRAUMATIC ARTHRITIS OF WRIST, RIGHT: Primary | ICD-10-CM

## 2020-01-01 DIAGNOSIS — E07.9 THYROID DISORDER: ICD-10-CM

## 2020-01-01 DIAGNOSIS — R79.89: Primary | ICD-10-CM

## 2020-01-01 DIAGNOSIS — I25.10 ATHEROSCLEROSIS OF NATIVE CORONARY ARTERY OF NATIVE HEART WITHOUT ANGINA PECTORIS: ICD-10-CM

## 2020-01-01 DIAGNOSIS — D51.0 PERNICIOUS ANEMIA: ICD-10-CM

## 2020-01-01 DIAGNOSIS — E05.90 HYPERTHYROIDISM: ICD-10-CM

## 2020-01-01 DIAGNOSIS — S43.004A DISLOCATION OF RIGHT SHOULDER JOINT, INITIAL ENCOUNTER: Primary | ICD-10-CM

## 2020-01-01 DIAGNOSIS — K82.8 GALLBLADDER SLUDGE: ICD-10-CM

## 2020-01-01 DIAGNOSIS — D51.8 OTHER VITAMIN B12 DEFICIENCY ANEMIA: ICD-10-CM

## 2020-01-01 DIAGNOSIS — E06.9 THYROIDITIS: ICD-10-CM

## 2020-01-01 DIAGNOSIS — N13.2 HYDRONEPHROSIS WITH URINARY OBSTRUCTION DUE TO RENAL CALCULUS: ICD-10-CM

## 2020-01-01 DIAGNOSIS — F01.50 VASCULAR DEMENTIA WITHOUT BEHAVIORAL DISTURBANCE (HCC): ICD-10-CM
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
## 2020-01-01 DIAGNOSIS — M25.432 SWELLING OF JOINT OF LEFT WRIST: ICD-10-CM

## 2020-01-01 DIAGNOSIS — K74.60 CIRRHOSIS OF LIVER WITH ASCITES, UNSPECIFIED HEPATIC CIRRHOSIS TYPE (HCC): Primary | ICD-10-CM

## 2020-01-01 DIAGNOSIS — R73.01 IMPAIRED FASTING GLUCOSE: ICD-10-CM

## 2020-01-01 DIAGNOSIS — N18.30 CKD (CHRONIC KIDNEY DISEASE), STAGE III (HCC): ICD-10-CM

## 2020-01-01 DIAGNOSIS — N20.1 URETEROLITHIASIS: ICD-10-CM

## 2020-01-01 DIAGNOSIS — N20.0 KIDNEY STONES: Primary | ICD-10-CM

## 2020-01-01 DIAGNOSIS — N18.30 STAGE 3 CHRONIC KIDNEY DISEASE (HCC): ICD-10-CM

## 2020-01-01 DIAGNOSIS — E05.90 HYPERTHYROIDISM: Primary | ICD-10-CM

## 2020-01-01 DIAGNOSIS — S22.49XA CLOSED FRACTURE OF MULTIPLE RIBS, UNSPECIFIED LATERALITY, INITIAL ENCOUNTER: ICD-10-CM

## 2020-01-01 DIAGNOSIS — M21.612 BUNION OF GREAT TOE OF LEFT FOOT: ICD-10-CM

## 2020-01-01 DIAGNOSIS — N17.9 AKI (ACUTE KIDNEY INJURY) (HCC): ICD-10-CM

## 2020-01-01 DIAGNOSIS — Z02.9 ENCOUNTERS FOR UNSPECIFIED ADMINISTRATIVE PURPOSE: ICD-10-CM

## 2020-01-01 DIAGNOSIS — R82.90 ABNORMAL URINALYSIS: Primary | ICD-10-CM

## 2020-01-01 DIAGNOSIS — N28.9 RENAL INSUFFICIENCY: ICD-10-CM

## 2020-01-01 DIAGNOSIS — R18.8 CIRRHOSIS OF LIVER WITH ASCITES, UNSPECIFIED HEPATIC CIRRHOSIS TYPE (HCC): ICD-10-CM

## 2020-01-01 DIAGNOSIS — R26.81 GAIT INSTABILITY: Primary | ICD-10-CM

## 2020-01-01 DIAGNOSIS — I47.2 VENTRICULAR TACHYCARDIA (HCC): Primary | ICD-10-CM

## 2020-01-01 DIAGNOSIS — I50.22 CHRONIC SYSTOLIC CONGESTIVE HEART FAILURE (HCC): ICD-10-CM

## 2020-01-01 DIAGNOSIS — R79.89 ABNORMAL TSH: Primary | ICD-10-CM

## 2020-01-01 DIAGNOSIS — K74.60 CIRRHOSIS OF LIVER WITH ASCITES, UNSPECIFIED HEPATIC CIRRHOSIS TYPE (HCC): ICD-10-CM

## 2020-01-01 DIAGNOSIS — R10.9 RIGHT SIDED ABDOMINAL PAIN: Primary | ICD-10-CM

## 2020-01-01 DIAGNOSIS — Z23 NEED FOR PNEUMOCOCCAL VACCINATION: ICD-10-CM

## 2020-01-01 DIAGNOSIS — R60.1 ANASARCA: ICD-10-CM

## 2020-01-01 DIAGNOSIS — N20.1 RIGHT URETERAL STONE: Primary | ICD-10-CM

## 2020-01-01 DIAGNOSIS — R18.8 CIRRHOSIS OF LIVER WITH ASCITES, UNSPECIFIED HEPATIC CIRRHOSIS TYPE (HCC): Primary | ICD-10-CM

## 2020-01-01 DIAGNOSIS — H91.93 BILATERAL HEARING LOSS, UNSPECIFIED HEARING LOSS TYPE: ICD-10-CM

## 2020-01-01 DIAGNOSIS — E87.70 HYPERVOLEMIA, UNSPECIFIED HYPERVOLEMIA TYPE: ICD-10-CM

## 2020-01-01 DIAGNOSIS — M79.89 SWELLING OF LOWER EXTREMITY: ICD-10-CM

## 2020-01-01 DIAGNOSIS — N18.30 CKD (CHRONIC KIDNEY DISEASE), STAGE III (HCC): Primary | ICD-10-CM

## 2020-01-01 DIAGNOSIS — R26.81 GAIT INSTABILITY: ICD-10-CM

## 2020-01-01 DIAGNOSIS — M79.672 FOOT PAIN, LEFT: Primary | ICD-10-CM

## 2020-01-01 DIAGNOSIS — R11.11 VOMITING WITHOUT NAUSEA, INTRACTABILITY OF VOMITING NOT SPECIFIED, UNSPECIFIED VOMITING TYPE: Primary | ICD-10-CM

## 2020-01-01 DIAGNOSIS — R60.0 EDEMA OF LEFT UPPER EXTREMITY: Primary | ICD-10-CM

## 2020-01-01 DIAGNOSIS — N17.9 AKI (ACUTE KIDNEY INJURY) (HCC): Primary | ICD-10-CM

## 2020-01-01 DIAGNOSIS — R82.90 ABNORMAL URINALYSIS: ICD-10-CM

## 2020-01-01 DIAGNOSIS — E78.00 HYPERCHOLESTEROLEMIA: ICD-10-CM

## 2020-01-01 DIAGNOSIS — F03.90 DEMENTIA WITHOUT BEHAVIORAL DISTURBANCE, UNSPECIFIED DEMENTIA TYPE (HCC): ICD-10-CM

## 2020-01-01 DIAGNOSIS — S43.004A CLOSED DISLOCATION OF RIGHT SHOULDER, INITIAL ENCOUNTER: Primary | ICD-10-CM

## 2020-01-01 DIAGNOSIS — R53.1 WEAKNESS GENERALIZED: Primary | ICD-10-CM

## 2020-01-01 DIAGNOSIS — E78.1 HYPERGLYCERIDEMIA: Primary | ICD-10-CM

## 2020-01-01 DIAGNOSIS — R79.89 ABNORMAL TSH: ICD-10-CM

## 2020-01-01 DIAGNOSIS — M25.531 ACUTE PAIN OF RIGHT WRIST: Primary | ICD-10-CM

## 2020-01-01 DIAGNOSIS — R77.8 ELEVATED TROPONIN: ICD-10-CM

## 2020-01-01 DIAGNOSIS — S01.01XS LACERATION OF SCALP, SEQUELA: Primary | ICD-10-CM

## 2020-01-01 LAB
#MXD IC: 0.4 X10ˆ3/UL (ref 0.1–1)
A1AT SERPL-MCNC: 164 MG/DL (ref 90–200)
AFP-TM SERPL-MCNC: 4.1 NG/ML (ref ?–8)
ALBUMIN SERPL-MCNC: 1.8 G/DL (ref 3.4–5)
ALBUMIN SERPL-MCNC: 1.9 G/DL (ref 3.4–5)
ALBUMIN SERPL-MCNC: 1.9 G/DL (ref 3.4–5)
ALBUMIN SERPL-MCNC: 2.1 G/DL (ref 3.4–5)
ALBUMIN SERPL-MCNC: 2.3 G/DL (ref 3.4–5)
ALBUMIN SERPL-MCNC: 2.4 G/DL (ref 3.4–5)
ALBUMIN SERPL-MCNC: 2.6 G/DL (ref 3.4–5)
ALBUMIN SERPL-MCNC: 2.6 G/DL (ref 3.4–5)
ALBUMIN/GLOB SERPL: 0.5 {RATIO} (ref 1–2)
ALBUMIN/GLOB SERPL: 0.6 {RATIO} (ref 1–2)
ALBUMIN/GLOB SERPL: 0.7 {RATIO} (ref 1–2)
ALP LIVER SERPL-CCNC: 100 U/L (ref 45–117)
ALP LIVER SERPL-CCNC: 103 U/L (ref 45–117)
ALP LIVER SERPL-CCNC: 126 U/L (ref 45–117)
ALP LIVER SERPL-CCNC: 161 U/L (ref 45–117)
ALP LIVER SERPL-CCNC: 172 U/L (ref 45–117)
ALP LIVER SERPL-CCNC: 188 U/L (ref 45–117)
ALT SERPL-CCNC: 108 U/L (ref 16–61)
ALT SERPL-CCNC: 71 U/L (ref 16–61)
ALT SERPL-CCNC: 71 U/L (ref 16–61)
ALT SERPL-CCNC: 80 U/L (ref 16–61)
ALT SERPL-CCNC: 85 U/L (ref 16–61)
ALT SERPL-CCNC: 96 U/L (ref 16–61)
ALT SERPL-CCNC: 98 U/L (ref 16–61)
ANION GAP SERPL CALC-SCNC: 11 MMOL/L (ref 0–18)
ANION GAP SERPL CALC-SCNC: 3 MMOL/L (ref 0–18)
ANION GAP SERPL CALC-SCNC: 3 MMOL/L (ref 0–18)
ANION GAP SERPL CALC-SCNC: 4 MMOL/L (ref 0–18)
ANION GAP SERPL CALC-SCNC: 4 MMOL/L (ref 0–18)
ANION GAP SERPL CALC-SCNC: 5 MMOL/L (ref 0–18)
ANION GAP SERPL CALC-SCNC: 5 MMOL/L (ref 0–18)
ANION GAP SERPL CALC-SCNC: 6 MMOL/L (ref 0–18)
ANION GAP SERPL CALC-SCNC: 6 MMOL/L (ref 0–18)
ANION GAP SERPL CALC-SCNC: 7 MMOL/L (ref 0–18)
ANION GAP SERPL CALC-SCNC: 8 MMOL/L (ref 0–18)
AST SERPL-CCNC: 76 U/L (ref 15–37)
AST SERPL-CCNC: 83 U/L (ref 15–37)
AST SERPL-CCNC: 83 U/L (ref 15–37)
AST SERPL-CCNC: 86 U/L (ref 15–37)
AST SERPL-CCNC: 86 U/L (ref 15–37)
AST SERPL-CCNC: 97 U/L (ref 15–37)
AST SERPL-CCNC: 99 U/L (ref 15–37)
BASOPHILS # BLD AUTO: 0.01 X10(3) UL (ref 0–0.2)
BASOPHILS # BLD AUTO: 0.02 X10(3) UL (ref 0–0.2)
BASOPHILS NFR BLD AUTO: 0.1 %
BASOPHILS NFR BLD AUTO: 0.1 %
BASOPHILS NFR BLD AUTO: 0.2 %
BASOPHILS NFR BLD AUTO: 0.3 %
BASOPHILS NFR BLD AUTO: 0.4 %
BILIRUB DIRECT SERPL-MCNC: 0.3 MG/DL (ref 0–0.2)
BILIRUB DIRECT SERPL-MCNC: 0.4 MG/DL (ref 0–0.2)
BILIRUB DIRECT SERPL-MCNC: 0.4 MG/DL (ref 0–0.2)
BILIRUB SERPL-MCNC: 0.5 MG/DL (ref 0.1–2)
BILIRUB SERPL-MCNC: 0.8 MG/DL (ref 0.1–2)
BILIRUB SERPL-MCNC: 1 MG/DL (ref 0.1–2)
BILIRUB SERPL-MCNC: 1.3 MG/DL (ref 0.1–2)
BILIRUB UR QL: NEGATIVE
BUN BLD-MCNC: 21 MG/DL (ref 7–18)
BUN BLD-MCNC: 22 MG/DL (ref 7–18)
BUN BLD-MCNC: 28 MG/DL (ref 7–18)
BUN BLD-MCNC: 29 MG/DL (ref 7–18)
BUN BLD-MCNC: 31 MG/DL (ref 7–18)
BUN BLD-MCNC: 32 MG/DL (ref 7–18)
BUN BLD-MCNC: 49 MG/DL (ref 7–18)
BUN BLD-MCNC: 49 MG/DL (ref 7–18)
BUN BLD-MCNC: 50 MG/DL (ref 7–18)
BUN/CREAT SERPL: 15.3 (ref 10–20)
BUN/CREAT SERPL: 15.8 (ref 10–20)
BUN/CREAT SERPL: 16.8 (ref 10–20)
BUN/CREAT SERPL: 17.1 (ref 10–20)
BUN/CREAT SERPL: 17.3 (ref 10–20)
BUN/CREAT SERPL: 17.8 (ref 10–20)
BUN/CREAT SERPL: 18.5 (ref 10–20)
BUN/CREAT SERPL: 18.5 (ref 10–20)
BUN/CREAT SERPL: 20.4 (ref 10–20)
BUN/CREAT SERPL: 22 (ref 10–20)
BUN/CREAT SERPL: 22.5 (ref 10–20)
CALCIUM BLD-MCNC: 7.7 MG/DL (ref 8.5–10.1)
CALCIUM BLD-MCNC: 7.8 MG/DL (ref 8.5–10.1)
CALCIUM BLD-MCNC: 7.9 MG/DL (ref 8.5–10.1)
CALCIUM BLD-MCNC: 8.4 MG/DL (ref 8.5–10.1)
CALCIUM BLD-MCNC: 8.5 MG/DL (ref 8.5–10.1)
CALCIUM BLD-MCNC: 8.6 MG/DL (ref 8.5–10.1)
CALCIUM BLD-MCNC: 8.6 MG/DL (ref 8.5–10.1)
CALCIUM BLD-MCNC: 8.7 MG/DL (ref 8.5–10.1)
CALCIUM BLD-MCNC: 8.9 MG/DL (ref 8.5–10.1)
CHLORIDE SERPL-SCNC: 106 MMOL/L (ref 98–112)
CHLORIDE SERPL-SCNC: 107 MMOL/L (ref 98–112)
CHLORIDE SERPL-SCNC: 108 MMOL/L (ref 98–112)
CHLORIDE SERPL-SCNC: 108 MMOL/L (ref 98–112)
CHLORIDE SERPL-SCNC: 109 MMOL/L (ref 98–112)
CHLORIDE SERPL-SCNC: 109 MMOL/L (ref 98–112)
CHLORIDE SERPL-SCNC: 110 MMOL/L (ref 98–112)
CHLORIDE SERPL-SCNC: 112 MMOL/L (ref 98–112)
CHLORIDE SERPL-SCNC: 114 MMOL/L (ref 98–112)
CHOLEST SMN-MCNC: 114 MG/DL (ref ?–200)
CHOLEST SMN-MCNC: 88 MG/DL (ref ?–200)
CO2 SERPL-SCNC: 22 MMOL/L (ref 21–32)
CO2 SERPL-SCNC: 22 MMOL/L (ref 21–32)
CO2 SERPL-SCNC: 23 MMOL/L (ref 21–32)
CO2 SERPL-SCNC: 25 MMOL/L (ref 21–32)
CO2 SERPL-SCNC: 25 MMOL/L (ref 21–32)
CO2 SERPL-SCNC: 26 MMOL/L (ref 21–32)
CO2 SERPL-SCNC: 27 MMOL/L (ref 21–32)
CO2 SERPL-SCNC: 28 MMOL/L (ref 21–32)
CO2 SERPL-SCNC: 28 MMOL/L (ref 21–32)
CO2 SERPL-SCNC: 29 MMOL/L (ref 21–32)
CO2 SERPL-SCNC: 30 MMOL/L (ref 21–32)
COLOR UR: YELLOW
COLOR UR: YELLOW
CREAT BLD-MCNC: 1.2 MG/DL (ref 0.7–1.3)
CREAT BLD-MCNC: 1.23 MG/DL (ref 0.7–1.3)
CREAT BLD-MCNC: 1.31 MG/DL (ref 0.7–1.3)
CREAT BLD-MCNC: 1.68 MG/DL (ref 0.7–1.3)
CREAT BLD-MCNC: 1.68 MG/DL (ref 0.7–1.3)
CREAT BLD-MCNC: 1.74 MG/DL (ref 0.7–1.3)
CREAT BLD-MCNC: 1.77 MG/DL (ref 0.7–1.3)
CREAT BLD-MCNC: 1.85 MG/DL (ref 0.7–1.3)
CREAT BLD-MCNC: 1.9 MG/DL (ref 0.7–1.3)
CREAT BLD-MCNC: 2.18 MG/DL (ref 0.7–1.3)
CREAT BLD-MCNC: 2.23 MG/DL (ref 0.7–1.3)
CREAT BLD-MCNC: 2.45 MG/DL (ref 0.7–1.3)
CREAT UR-SCNC: 104 MG/DL
CREAT UR-SCNC: 209 MG/DL
DEPRECATED HBV CORE AB SER IA-ACNC: 364.7 NG/ML (ref 30–530)
DEPRECATED RDW RBC AUTO: 52.1 FL (ref 35.1–46.3)
DEPRECATED RDW RBC AUTO: 52.3 FL (ref 35.1–46.3)
DEPRECATED RDW RBC AUTO: 52.5 FL (ref 35.1–46.3)
DEPRECATED RDW RBC AUTO: 52.9 FL (ref 35.1–46.3)
DEPRECATED RDW RBC AUTO: 52.9 FL (ref 35.1–46.3)
DEPRECATED RDW RBC AUTO: 58.3 FL (ref 35.1–46.3)
DEPRECATED RDW RBC AUTO: 61.1 FL (ref 35.1–46.3)
DEPRECATED RDW RBC AUTO: 61.3 FL (ref 35.1–46.3)
DEPRECATED RDW RBC AUTO: 61.4 FL (ref 35.1–46.3)
EOSINOPHIL # BLD AUTO: 0 X10(3) UL (ref 0–0.7)
EOSINOPHIL # BLD AUTO: 0.06 X10(3) UL (ref 0–0.7)
EOSINOPHIL # BLD AUTO: 0.07 X10(3) UL (ref 0–0.7)
EOSINOPHIL # BLD AUTO: 0.1 X10(3) UL (ref 0–0.7)
EOSINOPHIL # BLD AUTO: 0.12 X10(3) UL (ref 0–0.7)
EOSINOPHIL # BLD AUTO: 0.13 X10(3) UL (ref 0–0.7)
EOSINOPHIL # BLD AUTO: 0.14 X10(3) UL (ref 0–0.7)
EOSINOPHIL # BLD AUTO: 0.15 X10(3) UL (ref 0–0.7)
EOSINOPHIL NFR BLD AUTO: 0 %
EOSINOPHIL NFR BLD AUTO: 1 %
EOSINOPHIL NFR BLD AUTO: 1.1 %
EOSINOPHIL NFR BLD AUTO: 1.6 %
EOSINOPHIL NFR BLD AUTO: 1.7 %
EOSINOPHIL NFR BLD AUTO: 1.8 %
EOSINOPHIL NFR BLD AUTO: 1.9 %
EOSINOPHIL NFR BLD AUTO: 2.5 %
ERYTHROCYTE [DISTWIDTH] IN BLOOD BY AUTOMATED COUNT: 14.3 % (ref 11–15)
ERYTHROCYTE [DISTWIDTH] IN BLOOD BY AUTOMATED COUNT: 14.4 % (ref 11–15)
ERYTHROCYTE [DISTWIDTH] IN BLOOD BY AUTOMATED COUNT: 14.6 % (ref 11–15)
ERYTHROCYTE [DISTWIDTH] IN BLOOD BY AUTOMATED COUNT: 16 % (ref 11–15)
ERYTHROCYTE [DISTWIDTH] IN BLOOD BY AUTOMATED COUNT: 17.1 % (ref 11–15)
ERYTHROCYTE [DISTWIDTH] IN BLOOD BY AUTOMATED COUNT: 17.1 % (ref 11–15)
ERYTHROCYTE [DISTWIDTH] IN BLOOD BY AUTOMATED COUNT: 17.3 % (ref 11–15)
GLOBULIN PLAS-MCNC: 3.2 G/DL (ref 2.8–4.4)
GLOBULIN PLAS-MCNC: 3.6 G/DL (ref 2.8–4.4)
GLOBULIN PLAS-MCNC: 3.7 G/DL (ref 2.8–4.4)
GLUCOSE BLD-MCNC: 102 MG/DL (ref 70–99)
GLUCOSE BLD-MCNC: 103 MG/DL (ref 70–99)
GLUCOSE BLD-MCNC: 103 MG/DL (ref 70–99)
GLUCOSE BLD-MCNC: 108 MG/DL (ref 70–99)
GLUCOSE BLD-MCNC: 111 MG/DL (ref 70–99)
GLUCOSE BLD-MCNC: 111 MG/DL (ref 70–99)
GLUCOSE BLD-MCNC: 113 MG/DL (ref 70–99)
GLUCOSE BLD-MCNC: 115 MG/DL (ref 70–99)
GLUCOSE BLD-MCNC: 120 MG/DL (ref 70–99)
GLUCOSE BLD-MCNC: 131 MG/DL (ref 70–99)
GLUCOSE BLD-MCNC: 132 MG/DL (ref 70–99)
GLUCOSE BLD-MCNC: 84 MG/DL (ref 70–99)
GLUCOSE UR-MCNC: NEGATIVE MG/DL
HAV AB SER QL IA: REACTIVE
HAV IGM SER QL: 2 MG/DL (ref 1.6–2.6)
HAV IGM SER QL: 2 MG/DL (ref 1.6–2.6)
HAV IGM SER QL: 2.1 MG/DL (ref 1.6–2.6)
HAV IGM SER QL: NONREACTIVE
HBV CORE AB SERPL QL IA: NONREACTIVE
HBV SURFACE AB SER QL: NONREACTIVE
HBV SURFACE AB SERPL IA-ACNC: <3.1 MIU/ML
HBV SURFACE AG SERPL QL IA: NONREACTIVE
HCT VFR BLD AUTO: 29.9 % (ref 39–53)
HCT VFR BLD AUTO: 30.4 % (ref 39–53)
HCT VFR BLD AUTO: 34.1 % (ref 39–53)
HCT VFR BLD AUTO: 34.2 % (ref 39–53)
HCT VFR BLD AUTO: 34.7 % (ref 39–53)
HCT VFR BLD AUTO: 35.5 % (ref 39–53)
HCT VFR BLD AUTO: 37.1 % (ref 39–53)
HCT VFR BLD AUTO: 37.3 % (ref 39–53)
HCT VFR BLD AUTO: 39 % (ref 39–53)
HCT VFR BLD AUTO: 39.6 % (ref 39–53)
HCV AB SERPL QL IA: NONREACTIVE
HDLC SERPL-MCNC: 28 MG/DL (ref 40–59)
HDLC SERPL-MCNC: 45 MG/DL (ref 40–59)
HGB BLD-MCNC: 10.2 G/DL (ref 13–17.5)
HGB BLD-MCNC: 11.2 G/DL (ref 13–17.5)
HGB BLD-MCNC: 11.3 G/DL (ref 13–17.5)
HGB BLD-MCNC: 11.6 G/DL (ref 13–17.5)
HGB BLD-MCNC: 11.6 G/DL (ref 13–17.5)
HGB BLD-MCNC: 12.2 G/DL (ref 13–17.5)
HGB BLD-MCNC: 12.3 G/DL (ref 13–17.5)
HGB BLD-MCNC: 12.7 G/DL (ref 13–17.5)
HGB BLD-MCNC: 12.9 G/DL (ref 13–17.5)
HGB BLD-MCNC: 9.8 G/DL (ref 13–17.5)
HYALINE CASTS #/AREA URNS AUTO: 3 /LPF
HYALINE CASTS #/AREA URNS AUTO: 7 /LPF
IGA SERPL-MCNC: 460 MG/DL (ref 70–312)
IGM SERPL-MCNC: 149 MG/DL (ref 43–279)
IMM GRANULOCYTES # BLD AUTO: 0.01 X10(3) UL (ref 0–1)
IMM GRANULOCYTES # BLD AUTO: 0.02 X10(3) UL (ref 0–1)
IMM GRANULOCYTES # BLD AUTO: 0.03 X10(3) UL (ref 0–1)
IMM GRANULOCYTES NFR BLD: 0.2 %
IMM GRANULOCYTES NFR BLD: 0.3 %
IMM GRANULOCYTES NFR BLD: 0.3 %
IMM GRANULOCYTES NFR BLD: 0.4 %
IMM GRANULOCYTES NFR BLD: 0.5 %
IMMUNOGLOBULIN PNL SER-MCNC: 1100 MG/DL (ref 791–1643)
INR BLD: 1.31 (ref 0.9–1.2)
IRON SATURATION: 21 % (ref 20–50)
IRON SERPL-MCNC: 35 UG/DL (ref 65–175)
ISTAT BUN: 20 MG/DL (ref 7–18)
ISTAT CHLORIDE: 107 MMOL/L (ref 98–112)
ISTAT HEMATOCRIT: 42 %
ISTAT IONIZED CALCIUM FOR CHEM 8: 1.09 MMOL/L (ref 1.12–1.32)
ISTAT POTASSIUM: 4 MMOL/L (ref 3.6–5.1)
ISTAT SODIUM: 141 MMOL/L (ref 136–145)
ISTAT TCO2: 23 MMOL/L (ref 21–32)
KETONES UR-MCNC: NEGATIVE MG/DL
LDLC SERPL CALC-MCNC: 39 MG/DL (ref ?–100)
LDLC SERPL CALC-MCNC: 52 MG/DL (ref ?–100)
LEUKOCYTE ESTERASE UR QL STRIP.AUTO: NEGATIVE
LIPASE SERPL-CCNC: 122 U/L (ref 73–393)
LYMPHOCYTES # BLD AUTO: 1.1 X10(3) UL (ref 1–4)
LYMPHOCYTES # BLD AUTO: 1.32 X10(3) UL (ref 1–4)
LYMPHOCYTES # BLD AUTO: 1.41 X10(3) UL (ref 1–4)
LYMPHOCYTES # BLD AUTO: 1.46 X10(3) UL (ref 1–4)
LYMPHOCYTES # BLD AUTO: 1.48 X10(3) UL (ref 1–4)
LYMPHOCYTES # BLD AUTO: 1.63 X10(3) UL (ref 1–4)
LYMPHOCYTES # BLD AUTO: 1.7 X10ˆ3/UL (ref 1–4)
LYMPHOCYTES # BLD AUTO: 1.72 X10(3) UL (ref 1–4)
LYMPHOCYTES # BLD AUTO: 1.72 X10(3) UL (ref 1–4)
LYMPHOCYTES NFR BLD AUTO: 17.2 %
LYMPHOCYTES NFR BLD AUTO: 20.4 %
LYMPHOCYTES NFR BLD AUTO: 20.4 %
LYMPHOCYTES NFR BLD AUTO: 21 %
LYMPHOCYTES NFR BLD AUTO: 22.5 %
LYMPHOCYTES NFR BLD AUTO: 22.6 %
LYMPHOCYTES NFR BLD AUTO: 26.7 %
LYMPHOCYTES NFR BLD AUTO: 27.1 %
LYMPHOCYTES NFR BLD AUTO: 29.1 %
M PROTEIN MFR SERPL ELPH: 5.2 G/DL (ref 6.4–8.2)
M PROTEIN MFR SERPL ELPH: 5.3 G/DL (ref 6.4–8.2)
M PROTEIN MFR SERPL ELPH: 5.4 G/DL (ref 6.4–8.2)
M PROTEIN MFR SERPL ELPH: 6.1 G/DL (ref 6.4–8.2)
M PROTEIN MFR SERPL ELPH: 6.3 G/DL (ref 6.4–8.2)
M PROTEIN MFR SERPL ELPH: 6.6 G/DL (ref 6.4–8.2)
MCH RBC QN AUTO: 31.7 PG (ref 26–34)
MCH RBC QN AUTO: 32.2 PG (ref 26–34)
MCH RBC QN AUTO: 32.3 PG (ref 26–34)
MCH RBC QN AUTO: 32.4 PG (ref 26–34)
MCH RBC QN AUTO: 32.5 PG (ref 26–34)
MCH RBC QN AUTO: 32.6 PG (ref 26–34)
MCH RBC QN AUTO: 33 PG (ref 26–34)
MCH RBC QN AUTO: 33 PG (ref 26–34)
MCHC RBC AUTO-ENTMCNC: 32.1 G/DL (ref 31–37)
MCHC RBC AUTO-ENTMCNC: 32.7 G/DL (ref 31–37)
MCHC RBC AUTO-ENTMCNC: 32.7 G/DL (ref 31–37)
MCHC RBC AUTO-ENTMCNC: 32.8 G/DL (ref 31–37)
MCHC RBC AUTO-ENTMCNC: 32.9 G/DL (ref 31–37)
MCHC RBC AUTO-ENTMCNC: 33 G/DL (ref 31–37)
MCHC RBC AUTO-ENTMCNC: 33.1 G/DL (ref 31–37)
MCHC RBC AUTO-ENTMCNC: 33.1 G/DL (ref 31–37)
MCHC RBC AUTO-ENTMCNC: 33.4 G/DL (ref 31–37)
MCHC RBC AUTO-ENTMCNC: 33.6 G/DL (ref 31–37)
MCV RBC AUTO: 97.3 FL (ref 80–100)
MCV RBC AUTO: 98 FL (ref 80–100)
MCV RBC AUTO: 98.2 FL (ref 80–100)
MCV RBC AUTO: 98.3 FL (ref 80–100)
MCV RBC AUTO: 98.4 FL (ref 80–100)
MCV RBC AUTO: 98.4 FL (ref 80–100)
MCV RBC AUTO: 98.8 FL (ref 80–100)
MCV RBC AUTO: 98.9 FL (ref 80–100)
MCV RBC AUTO: 98.9 FL (ref 80–100)
MCV RBC AUTO: 99.2 FL (ref 80–100)
MICROALBUMIN UR-MCNC: 5.94 MG/DL
MICROALBUMIN/CREAT 24H UR-RTO: 28.4 UG/MG (ref ?–30)
MITOCHONDRIA AB TITR SER: <20 {TITER}
MIXED CELL %: 6.8 %
MONOCYTES # BLD AUTO: 0.43 X10(3) UL (ref 0.1–1)
MONOCYTES # BLD AUTO: 0.45 X10(3) UL (ref 0.1–1)
MONOCYTES # BLD AUTO: 0.47 X10(3) UL (ref 0.1–1)
MONOCYTES # BLD AUTO: 0.49 X10(3) UL (ref 0.1–1)
MONOCYTES # BLD AUTO: 0.52 X10(3) UL (ref 0.1–1)
MONOCYTES # BLD AUTO: 0.54 X10(3) UL (ref 0.1–1)
MONOCYTES # BLD AUTO: 0.56 X10(3) UL (ref 0.1–1)
MONOCYTES # BLD AUTO: 0.57 X10(3) UL (ref 0.1–1)
MONOCYTES NFR BLD AUTO: 6.6 %
MONOCYTES NFR BLD AUTO: 7.3 %
MONOCYTES NFR BLD AUTO: 7.4 %
MONOCYTES NFR BLD AUTO: 7.7 %
MONOCYTES NFR BLD AUTO: 7.8 %
MONOCYTES NFR BLD AUTO: 7.9 %
MONOCYTES NFR BLD AUTO: 8 %
MONOCYTES NFR BLD AUTO: 8.3 %
NEUTROPHILS # BLD AUTO: 3.35 X10 (3) UL (ref 1.5–7.7)
NEUTROPHILS # BLD AUTO: 3.35 X10(3) UL (ref 1.5–7.7)
NEUTROPHILS # BLD AUTO: 4 X10 (3) UL (ref 1.5–7.7)
NEUTROPHILS # BLD AUTO: 4 X10(3) UL (ref 1.5–7.7)
NEUTROPHILS # BLD AUTO: 4.1 X10ˆ3/UL (ref 1.5–7.7)
NEUTROPHILS # BLD AUTO: 4.13 X10 (3) UL (ref 1.5–7.7)
NEUTROPHILS # BLD AUTO: 4.13 X10(3) UL (ref 1.5–7.7)
NEUTROPHILS # BLD AUTO: 4.23 X10 (3) UL (ref 1.5–7.7)
NEUTROPHILS # BLD AUTO: 4.23 X10(3) UL (ref 1.5–7.7)
NEUTROPHILS # BLD AUTO: 4.75 X10 (3) UL (ref 1.5–7.7)
NEUTROPHILS # BLD AUTO: 4.75 X10(3) UL (ref 1.5–7.7)
NEUTROPHILS # BLD AUTO: 4.78 X10 (3) UL (ref 1.5–7.7)
NEUTROPHILS # BLD AUTO: 4.78 X10(3) UL (ref 1.5–7.7)
NEUTROPHILS # BLD AUTO: 5.04 X10 (3) UL (ref 1.5–7.7)
NEUTROPHILS # BLD AUTO: 5.04 X10(3) UL (ref 1.5–7.7)
NEUTROPHILS # BLD AUTO: 5.96 X10 (3) UL (ref 1.5–7.7)
NEUTROPHILS # BLD AUTO: 5.96 X10(3) UL (ref 1.5–7.7)
NEUTROPHILS NFR BLD AUTO: 59.6 %
NEUTROPHILS NFR BLD AUTO: 63.9 %
NEUTROPHILS NFR BLD AUTO: 66.1 %
NEUTROPHILS NFR BLD AUTO: 67.5 %
NEUTROPHILS NFR BLD AUTO: 68.4 %
NEUTROPHILS NFR BLD AUTO: 68.6 %
NEUTROPHILS NFR BLD AUTO: 69.5 %
NEUTROPHILS NFR BLD AUTO: 70.7 %
NEUTROPHILS NFR BLD AUTO: 74.4 %
NITRITE UR QL STRIP.AUTO: NEGATIVE
NONHDLC SERPL-MCNC: 60 MG/DL (ref ?–130)
NONHDLC SERPL-MCNC: 69 MG/DL (ref ?–130)
NT-PROBNP SERPL-MCNC: 6413 PG/ML (ref ?–450)
NUCLEAR IGG TITR SER IF: NEGATIVE {TITER}
OSMOLALITY SERPL CALC.SUM OF ELEC: 292 MOSM/KG (ref 275–295)
OSMOLALITY SERPL CALC.SUM OF ELEC: 296 MOSM/KG (ref 275–295)
OSMOLALITY SERPL CALC.SUM OF ELEC: 296 MOSM/KG (ref 275–295)
OSMOLALITY SERPL CALC.SUM OF ELEC: 297 MOSM/KG (ref 275–295)
OSMOLALITY SERPL CALC.SUM OF ELEC: 299 MOSM/KG (ref 275–295)
OSMOLALITY SERPL CALC.SUM OF ELEC: 299 MOSM/KG (ref 275–295)
OSMOLALITY SERPL CALC.SUM OF ELEC: 301 MOSM/KG (ref 275–295)
OSMOLALITY SERPL CALC.SUM OF ELEC: 303 MOSM/KG (ref 275–295)
OSMOLALITY SERPL CALC.SUM OF ELEC: 304 MOSM/KG (ref 275–295)
OSMOLALITY SERPL CALC.SUM OF ELEC: 304 MOSM/KG (ref 275–295)
OSMOLALITY SERPL CALC.SUM OF ELEC: 311 MOSM/KG (ref 275–295)
PATIENT FASTING Y/N/NP: NO
PATIENT FASTING Y/N/NP: NO
PATIENT FASTING Y/N/NP: YES
PH UR: 5 [PH] (ref 5–8)
PHOSPHATE SERPL-MCNC: 3.1 MG/DL (ref 2.5–4.9)
PHOSPHATE SERPL-MCNC: 3.4 MG/DL (ref 2.5–4.9)
PHOSPHATE SERPL-MCNC: 3.5 MG/DL (ref 2.5–4.9)
PHOSPHATE SERPL-MCNC: 3.9 MG/DL (ref 2.5–4.9)
PLATELET # BLD AUTO: 102 10(3)UL (ref 150–450)
PLATELET # BLD AUTO: 103 10(3)UL (ref 150–450)
PLATELET # BLD AUTO: 115 10(3)UL (ref 150–450)
PLATELET # BLD AUTO: 118 10(3)UL (ref 150–450)
PLATELET # BLD AUTO: 130 10(3)UL (ref 150–450)
PLATELET # BLD AUTO: 140 X10ˆ3/UL (ref 150–450)
PLATELET # BLD AUTO: 152 10(3)UL (ref 150–450)
PLATELET # BLD AUTO: 170 10(3)UL (ref 150–450)
PLATELET # BLD AUTO: 177 10(3)UL (ref 150–450)
PLATELET # BLD AUTO: 98 10(3)UL (ref 150–450)
POTASSIUM SERPL-SCNC: 3.9 MMOL/L (ref 3.5–5.1)
POTASSIUM SERPL-SCNC: 3.9 MMOL/L (ref 3.5–5.1)
POTASSIUM SERPL-SCNC: 4.1 MMOL/L (ref 3.5–5.1)
POTASSIUM SERPL-SCNC: 4.2 MMOL/L (ref 3.5–5.1)
POTASSIUM SERPL-SCNC: 4.3 MMOL/L (ref 3.5–5.1)
POTASSIUM SERPL-SCNC: 4.3 MMOL/L (ref 3.5–5.1)
POTASSIUM SERPL-SCNC: 4.7 MMOL/L (ref 3.5–5.1)
PROCALCITONIN SERPL-MCNC: 0.25 NG/ML (ref ?–0.16)
PROT UR-MCNC: 30 MG/DL
PROT UR-MCNC: NEGATIVE MG/DL
PROT UR-MCNC: NEGATIVE MG/DL
PROTHROMBIN TIME: 16.1 SECONDS (ref 11.8–14.5)
RBC # BLD AUTO: 3.04 X10(6)UL (ref 3.8–5.8)
RBC # BLD AUTO: 3.09 X10(6)UL (ref 3.8–5.8)
RBC # BLD AUTO: 3.48 X10(6)UL (ref 3.8–5.8)
RBC # BLD AUTO: 3.48 X10(6)UL (ref 3.8–5.8)
RBC # BLD AUTO: 3.51 X10(6)UL (ref 3.8–5.8)
RBC # BLD AUTO: 3.59 X10(6)UL (ref 3.8–5.8)
RBC # BLD AUTO: 3.74 X10(6)UL (ref 3.8–5.8)
RBC # BLD AUTO: 3.8 X10(6)UL (ref 3.8–5.8)
RBC # BLD AUTO: 4.01 X10(6)UL (ref 3.8–5.8)
RBC # BLD AUTO: 4.01 X10ˆ6/UL (ref 3.8–5.8)
RBC #/AREA URNS AUTO: 1379 /HPF
RBC #/AREA URNS AUTO: 3 /HPF
RBC #/AREA URNS AUTO: 4 /HPF
SARS-COV-2 RNA RESP QL NAA+PROBE: NOT DETECTED
SMOOTH MUSCLE AB TITR SER: 20 {TITER}
SODIUM SERPL-SCNC: 139 MMOL/L (ref 136–145)
SODIUM SERPL-SCNC: 140 MMOL/L (ref 136–145)
SODIUM SERPL-SCNC: 141 MMOL/L (ref 136–145)
SODIUM SERPL-SCNC: 142 MMOL/L (ref 136–145)
SODIUM SERPL-SCNC: 143 MMOL/L (ref 136–145)
SODIUM SERPL-SCNC: 143 MMOL/L (ref 136–145)
SODIUM SERPL-SCNC: 144 MMOL/L (ref 136–145)
SODIUM SERPL-SCNC: 49 MMOL/L
SP GR UR STRIP: 1.01 (ref 1–1.03)
SP GR UR STRIP: 1.02 (ref 1–1.03)
SP GR UR STRIP: 1.03 (ref 1–1.03)
T3FREE SERPL-MCNC: 0.71 PG/ML (ref 2.4–4.2)
T3FREE SERPL-MCNC: 0.97 PG/ML (ref 2.4–4.2)
T4 FREE SERPL-MCNC: 1.9 NG/DL (ref 0.8–1.7)
T4 FREE SERPL-MCNC: 2 NG/DL (ref 0.8–1.7)
T4 FREE SERPL-MCNC: 2.3 NG/DL (ref 0.8–1.7)
THYROID STIMULATING IMMUNOGLOBULIN: <0.1 IU/L
TOTAL IRON BINDING CAPACITY: 165 UG/DL (ref 240–450)
TRANSFERRIN SERPL-MCNC: 111 MG/DL (ref 200–360)
TRIGL SERPL-MCNC: 103 MG/DL (ref 30–149)
TRIGL SERPL-MCNC: 85 MG/DL (ref 30–149)
TROPONIN I SERPL-MCNC: 0.07 NG/ML (ref ?–0.04)
TROPONIN I SERPL-MCNC: 0.07 NG/ML (ref ?–0.04)
TROPONIN I SERPL-MCNC: 0.08 NG/ML (ref ?–0.04)
TSI SER-ACNC: 0.16 MIU/ML (ref 0.36–3.74)
TSI SER-ACNC: 0.18 MIU/ML (ref 0.36–3.74)
TSI SER-ACNC: 0.68 MIU/ML (ref 0.36–3.74)
TSI SER-ACNC: 0.8 MIU/ML (ref 0.36–3.74)
TSI SER-ACNC: 1.12 MIU/ML (ref 0.36–3.74)
UROBILINOGEN UR STRIP-ACNC: 2
UROBILINOGEN UR STRIP-ACNC: <2
UROBILINOGEN UR STRIP-ACNC: <2
VIT B12 SERPL-MCNC: 1207 PG/ML (ref 193–986)
VLDLC SERPL CALC-MCNC: 17 MG/DL (ref 0–30)
VLDLC SERPL CALC-MCNC: 21 MG/DL (ref 0–30)
WBC # BLD AUTO: 5.6 X10(3) UL (ref 4–11)
WBC # BLD AUTO: 5.9 X10(3) UL (ref 4–11)
WBC # BLD AUTO: 6.2 X10ˆ3/UL (ref 4–11)
WBC # BLD AUTO: 6.3 X10(3) UL (ref 4–11)
WBC # BLD AUTO: 6.4 X10(3) UL (ref 4–11)
WBC # BLD AUTO: 6.5 X10(3) UL (ref 4–11)
WBC # BLD AUTO: 7 X10(3) UL (ref 4–11)
WBC # BLD AUTO: 7.3 X10(3) UL (ref 4–11)
WBC # BLD AUTO: 8.4 X10(3) UL (ref 4–11)
WBC # BLD AUTO: 8.8 X10(3) UL (ref 4–11)
WBC #/AREA URNS AUTO: 3 /HPF
WBC #/AREA URNS AUTO: 45 /HPF
WBC #/AREA URNS AUTO: 6 /HPF

## 2020-01-01 PROCEDURE — 71101 X-RAY EXAM UNILAT RIBS/CHEST: CPT | Performed by: EMERGENCY MEDICINE

## 2020-01-01 PROCEDURE — 87186 SC STD MICRODIL/AGAR DIL: CPT

## 2020-01-01 PROCEDURE — 74019 RADEX ABDOMEN 2 VIEWS: CPT | Performed by: UROLOGY

## 2020-01-01 PROCEDURE — 84443 ASSAY THYROID STIM HORMONE: CPT

## 2020-01-01 PROCEDURE — 99232 SBSQ HOSP IP/OBS MODERATE 35: CPT | Performed by: INTERNAL MEDICINE

## 2020-01-01 PROCEDURE — 85025 COMPLETE CBC W/AUTO DIFF WBC: CPT

## 2020-01-01 PROCEDURE — 73130 X-RAY EXAM OF HAND: CPT | Performed by: EMERGENCY MEDICINE

## 2020-01-01 PROCEDURE — 99214 OFFICE O/P EST MOD 30 MIN: CPT | Performed by: NURSE PRACTITIONER

## 2020-01-01 PROCEDURE — G0463 HOSPITAL OUTPT CLINIC VISIT: HCPCS | Performed by: ORTHOPAEDIC SURGERY

## 2020-01-01 PROCEDURE — 36415 COLL VENOUS BLD VENIPUNCTURE: CPT

## 2020-01-01 PROCEDURE — 99231 SBSQ HOSP IP/OBS SF/LOW 25: CPT | Performed by: NURSE PRACTITIONER

## 2020-01-01 PROCEDURE — 1111F DSCHRG MED/CURRENT MED MERGE: CPT | Performed by: INTERNAL MEDICINE

## 2020-01-01 PROCEDURE — 84481 FREE ASSAY (FT-3): CPT

## 2020-01-01 PROCEDURE — 99232 SBSQ HOSP IP/OBS MODERATE 35: CPT | Performed by: NURSE PRACTITIONER

## 2020-01-01 PROCEDURE — 99214 OFFICE O/P EST MOD 30 MIN: CPT | Performed by: ORTHOPAEDIC SURGERY

## 2020-01-01 PROCEDURE — 84439 ASSAY OF FREE THYROXINE: CPT

## 2020-01-01 PROCEDURE — 99356 PROLONGED SERV,INPATIENT,1ST HR: CPT | Performed by: NURSE PRACTITIONER

## 2020-01-01 PROCEDURE — 99213 OFFICE O/P EST LOW 20 MIN: CPT | Performed by: UROLOGY

## 2020-01-01 PROCEDURE — 87086 URINE CULTURE/COLONY COUNT: CPT

## 2020-01-01 PROCEDURE — 81001 URINALYSIS AUTO W/SCOPE: CPT

## 2020-01-01 PROCEDURE — 73110 X-RAY EXAM OF WRIST: CPT | Performed by: EMERGENCY MEDICINE

## 2020-01-01 PROCEDURE — 99214 OFFICE O/P EST MOD 30 MIN: CPT | Performed by: INTERNAL MEDICINE

## 2020-01-01 PROCEDURE — 99203 OFFICE O/P NEW LOW 30 MIN: CPT | Performed by: PODIATRIST

## 2020-01-01 PROCEDURE — 71045 X-RAY EXAM CHEST 1 VIEW: CPT | Performed by: EMERGENCY MEDICINE

## 2020-01-01 PROCEDURE — 84450 TRANSFERASE (AST) (SGOT): CPT

## 2020-01-01 PROCEDURE — 74177 CT ABD & PELVIS W/CONTRAST: CPT | Performed by: EMERGENCY MEDICINE

## 2020-01-01 PROCEDURE — 99223 1ST HOSP IP/OBS HIGH 75: CPT | Performed by: UROLOGY

## 2020-01-01 PROCEDURE — 74018 RADEX ABDOMEN 1 VIEW: CPT | Performed by: UROLOGY

## 2020-01-01 PROCEDURE — 73030 X-RAY EXAM OF SHOULDER: CPT | Performed by: EMERGENCY MEDICINE

## 2020-01-01 PROCEDURE — 90732 PPSV23 VACC 2 YRS+ SUBQ/IM: CPT | Performed by: INTERNAL MEDICINE

## 2020-01-01 PROCEDURE — 99223 1ST HOSP IP/OBS HIGH 75: CPT | Performed by: SURGERY

## 2020-01-01 PROCEDURE — 99213 OFFICE O/P EST LOW 20 MIN: CPT

## 2020-01-01 PROCEDURE — 76705 ECHO EXAM OF ABDOMEN: CPT | Performed by: EMERGENCY MEDICINE

## 2020-01-01 PROCEDURE — 99214 OFFICE O/P EST MOD 30 MIN: CPT

## 2020-01-01 PROCEDURE — 99442 PHONE E/M BY PHYS 11-20 MIN: CPT | Performed by: INTERNAL MEDICINE

## 2020-01-01 PROCEDURE — 52332 CYSTOSCOPY AND TREATMENT: CPT | Performed by: UROLOGY

## 2020-01-01 PROCEDURE — G0463 HOSPITAL OUTPT CLINIC VISIT: HCPCS | Performed by: UROLOGY

## 2020-01-01 PROCEDURE — 36415 COLL VENOUS BLD VENIPUNCTURE: CPT | Performed by: INTERNAL MEDICINE

## 2020-01-01 PROCEDURE — 99222 1ST HOSP IP/OBS MODERATE 55: CPT | Performed by: INTERNAL MEDICINE

## 2020-01-01 PROCEDURE — 87077 CULTURE AEROBIC IDENTIFY: CPT

## 2020-01-01 PROCEDURE — 82043 UR ALBUMIN QUANTITATIVE: CPT

## 2020-01-01 PROCEDURE — 80076 HEPATIC FUNCTION PANEL: CPT

## 2020-01-01 PROCEDURE — 99223 1ST HOSP IP/OBS HIGH 75: CPT | Performed by: INTERNAL MEDICINE

## 2020-01-01 PROCEDURE — 84460 ALANINE AMINO (ALT) (SGPT): CPT

## 2020-01-01 PROCEDURE — 80069 RENAL FUNCTION PANEL: CPT

## 2020-01-01 PROCEDURE — 80047 BASIC METABLC PNL IONIZED CA: CPT

## 2020-01-01 PROCEDURE — 99223 1ST HOSP IP/OBS HIGH 75: CPT | Performed by: NURSE PRACTITIONER

## 2020-01-01 PROCEDURE — 99233 SBSQ HOSP IP/OBS HIGH 50: CPT | Performed by: NURSE PRACTITIONER

## 2020-01-01 PROCEDURE — G0463 HOSPITAL OUTPT CLINIC VISIT: HCPCS | Performed by: NURSE PRACTITIONER

## 2020-01-01 PROCEDURE — 80048 BASIC METABOLIC PNL TOTAL CA: CPT

## 2020-01-01 PROCEDURE — 82570 ASSAY OF URINE CREATININE: CPT

## 2020-01-01 PROCEDURE — 99232 SBSQ HOSP IP/OBS MODERATE 35: CPT | Performed by: UROLOGY

## 2020-01-01 PROCEDURE — BT1D1ZZ FLUOROSCOPY OF RIGHT KIDNEY, URETER AND BLADDER USING LOW OSMOLAR CONTRAST: ICD-10-PCS | Performed by: UROLOGY

## 2020-01-01 PROCEDURE — 84445 ASSAY OF TSI GLOBULIN: CPT

## 2020-01-01 PROCEDURE — 99232 SBSQ HOSP IP/OBS MODERATE 35: CPT | Performed by: PHYSICIAN ASSISTANT

## 2020-01-01 PROCEDURE — 99284 EMERGENCY DEPT VISIT MOD MDM: CPT

## 2020-01-01 PROCEDURE — G0009 ADMIN PNEUMOCOCCAL VACCINE: HCPCS | Performed by: INTERNAL MEDICINE

## 2020-01-01 PROCEDURE — 85025 COMPLETE CBC W/AUTO DIFF WBC: CPT | Performed by: EMERGENCY MEDICINE

## 2020-01-01 PROCEDURE — 70450 CT HEAD/BRAIN W/O DYE: CPT | Performed by: EMERGENCY MEDICINE

## 2020-01-01 PROCEDURE — 96374 THER/PROPH/DIAG INJ IV PUSH: CPT

## 2020-01-01 PROCEDURE — G0439 PPPS, SUBSEQ VISIT: HCPCS | Performed by: INTERNAL MEDICINE

## 2020-01-01 PROCEDURE — 52310 CYSTOSCOPY AND TREATMENT: CPT | Performed by: UROLOGY

## 2020-01-01 PROCEDURE — G0463 HOSPITAL OUTPT CLINIC VISIT: HCPCS | Performed by: INTERNAL MEDICINE

## 2020-01-01 PROCEDURE — 23650 CLTX SHO DSLC W/MNPJ WO ANES: CPT

## 2020-01-01 PROCEDURE — 73090 X-RAY EXAM OF FOREARM: CPT | Performed by: EMERGENCY MEDICINE

## 2020-01-01 PROCEDURE — 80061 LIPID PANEL: CPT

## 2020-01-01 PROCEDURE — 99233 SBSQ HOSP IP/OBS HIGH 50: CPT | Performed by: INTERNAL MEDICINE

## 2020-01-01 PROCEDURE — 0T768DZ DILATION OF RIGHT URETER WITH INTRALUMINAL DEVICE, VIA NATURAL OR ARTIFICIAL OPENING ENDOSCOPIC: ICD-10-PCS | Performed by: UROLOGY

## 2020-01-01 PROCEDURE — 96375 TX/PRO/DX INJ NEW DRUG ADDON: CPT

## 2020-01-01 PROCEDURE — 99203 OFFICE O/P NEW LOW 30 MIN: CPT | Performed by: INTERNAL MEDICINE

## 2020-01-01 PROCEDURE — 99204 OFFICE O/P NEW MOD 45 MIN: CPT | Performed by: ORTHOPAEDIC SURGERY

## 2020-01-01 PROCEDURE — 99239 HOSP IP/OBS DSCHRG MGMT >30: CPT | Performed by: INTERNAL MEDICINE

## 2020-01-01 PROCEDURE — 76705 ECHO EXAM OF ABDOMEN: CPT | Performed by: INTERNAL MEDICINE

## 2020-01-01 DEVICE — URETERAL STENT
Type: IMPLANTABLE DEVICE | Site: URETER | Status: FUNCTIONAL
Brand: ASCERTA™

## 2020-01-01 RX ORDER — METOCLOPRAMIDE HYDROCHLORIDE 5 MG/ML
5 INJECTION INTRAMUSCULAR; INTRAVENOUS EVERY 8 HOURS PRN
Status: DISCONTINUED | OUTPATIENT
Start: 2020-01-01 | End: 2020-01-01

## 2020-01-01 RX ORDER — HYDROCODONE BITARTRATE AND ACETAMINOPHEN 5; 325 MG/1; MG/1
2 TABLET ORAL AS NEEDED
Status: DISCONTINUED | OUTPATIENT
Start: 2020-01-01 | End: 2020-01-01 | Stop reason: HOSPADM

## 2020-01-01 RX ORDER — PANTOPRAZOLE SODIUM 40 MG/1
40 TABLET, DELAYED RELEASE ORAL
Qty: 30 TABLET | Refills: 0 | Status: SHIPPED | OUTPATIENT
Start: 2020-01-01 | End: 2020-01-01

## 2020-01-01 RX ORDER — SODIUM CHLORIDE 9 MG/ML
INJECTION, SOLUTION INTRAVENOUS CONTINUOUS
Status: DISCONTINUED | OUTPATIENT
Start: 2020-01-01 | End: 2020-01-01

## 2020-01-01 RX ORDER — ACETAMINOPHEN 325 MG/1
650 TABLET ORAL EVERY 6 HOURS PRN
Status: DISCONTINUED | OUTPATIENT
Start: 2020-01-01 | End: 2020-01-01

## 2020-01-01 RX ORDER — ALBUMIN, HUMAN INJ 5% 5 %
500 SOLUTION INTRAVENOUS ONCE
Status: COMPLETED | OUTPATIENT
Start: 2020-01-01 | End: 2020-01-01

## 2020-01-01 RX ORDER — NALOXONE HYDROCHLORIDE 0.4 MG/ML
80 INJECTION, SOLUTION INTRAMUSCULAR; INTRAVENOUS; SUBCUTANEOUS AS NEEDED
Status: DISCONTINUED | OUTPATIENT
Start: 2020-01-01 | End: 2020-01-01 | Stop reason: HOSPADM

## 2020-01-01 RX ORDER — MORPHINE SULFATE 10 MG/ML
6 INJECTION, SOLUTION INTRAMUSCULAR; INTRAVENOUS EVERY 10 MIN PRN
Status: DISCONTINUED | OUTPATIENT
Start: 2020-01-01 | End: 2020-01-01 | Stop reason: HOSPADM

## 2020-01-01 RX ORDER — ATORVASTATIN CALCIUM 20 MG/1
20 TABLET, FILM COATED ORAL NIGHTLY
Status: DISCONTINUED | OUTPATIENT
Start: 2020-01-01 | End: 2020-01-01

## 2020-01-01 RX ORDER — CARVEDILOL 6.25 MG/1
6.25 TABLET ORAL 2 TIMES DAILY WITH MEALS
Status: DISCONTINUED | OUTPATIENT
Start: 2020-01-01 | End: 2020-01-01

## 2020-01-01 RX ORDER — ETOMIDATE 2 MG/ML
INJECTION INTRAVENOUS
Status: COMPLETED
Start: 2020-01-01 | End: 2020-01-01

## 2020-01-01 RX ORDER — MORPHINE SULFATE 2 MG/ML
1 INJECTION, SOLUTION INTRAMUSCULAR; INTRAVENOUS EVERY 2 HOUR PRN
Status: DISCONTINUED | OUTPATIENT
Start: 2020-01-01 | End: 2020-01-01

## 2020-01-01 RX ORDER — ASPIRIN 81 MG/1
81 TABLET ORAL DAILY
Status: DISCONTINUED | OUTPATIENT
Start: 2020-01-01 | End: 2020-01-01

## 2020-01-01 RX ORDER — ONDANSETRON 2 MG/ML
4 INJECTION INTRAMUSCULAR; INTRAVENOUS ONCE
Status: COMPLETED | OUTPATIENT
Start: 2020-01-01 | End: 2020-01-01

## 2020-01-01 RX ORDER — HYDROCODONE BITARTRATE AND ACETAMINOPHEN 5; 325 MG/1; MG/1
1 TABLET ORAL EVERY 6 HOURS PRN
Status: DISCONTINUED | OUTPATIENT
Start: 2020-01-01 | End: 2020-01-01

## 2020-01-01 RX ORDER — ONDANSETRON 2 MG/ML
INJECTION INTRAMUSCULAR; INTRAVENOUS
Status: COMPLETED
Start: 2020-01-01 | End: 2020-01-01

## 2020-01-01 RX ORDER — PROCHLORPERAZINE EDISYLATE 5 MG/ML
5 INJECTION INTRAMUSCULAR; INTRAVENOUS ONCE AS NEEDED
Status: DISCONTINUED | OUTPATIENT
Start: 2020-01-01 | End: 2020-01-01 | Stop reason: HOSPADM

## 2020-01-01 RX ORDER — HYDROMORPHONE HYDROCHLORIDE 1 MG/ML
0.6 INJECTION, SOLUTION INTRAMUSCULAR; INTRAVENOUS; SUBCUTANEOUS EVERY 5 MIN PRN
Status: DISCONTINUED | OUTPATIENT
Start: 2020-01-01 | End: 2020-01-01 | Stop reason: HOSPADM

## 2020-01-01 RX ORDER — ONDANSETRON 2 MG/ML
4 INJECTION INTRAMUSCULAR; INTRAVENOUS EVERY 6 HOURS PRN
Status: DISCONTINUED | OUTPATIENT
Start: 2020-01-01 | End: 2020-01-01

## 2020-01-01 RX ORDER — MORPHINE SULFATE 4 MG/ML
4 INJECTION, SOLUTION INTRAMUSCULAR; INTRAVENOUS ONCE
Status: COMPLETED | OUTPATIENT
Start: 2020-01-01 | End: 2020-01-01

## 2020-01-01 RX ORDER — PANTOPRAZOLE SODIUM 40 MG/1
TABLET, DELAYED RELEASE ORAL
Qty: 30 TABLET | Refills: 0 | Status: SHIPPED | OUTPATIENT
Start: 2020-01-01 | End: 2020-01-01

## 2020-01-01 RX ORDER — HALOPERIDOL 5 MG/ML
0.25 INJECTION INTRAMUSCULAR ONCE AS NEEDED
Status: DISCONTINUED | OUTPATIENT
Start: 2020-01-01 | End: 2020-01-01 | Stop reason: HOSPADM

## 2020-01-01 RX ORDER — METOPROLOL TARTRATE 5 MG/5ML
2.5 INJECTION INTRAVENOUS ONCE
Status: DISCONTINUED | OUTPATIENT
Start: 2020-01-01 | End: 2020-01-01 | Stop reason: HOSPADM

## 2020-01-01 RX ORDER — ONDANSETRON 4 MG/1
4 TABLET, FILM COATED ORAL EVERY 8 HOURS PRN
Qty: 30 TABLET | Refills: 0 | Status: SHIPPED | OUTPATIENT
Start: 2020-01-01 | End: 2020-01-01

## 2020-01-01 RX ORDER — MORPHINE SULFATE 2 MG/ML
2 INJECTION, SOLUTION INTRAMUSCULAR; INTRAVENOUS EVERY 2 HOUR PRN
Status: DISCONTINUED | OUTPATIENT
Start: 2020-01-01 | End: 2020-01-01

## 2020-01-01 RX ORDER — AMIODARONE HYDROCHLORIDE 200 MG/1
200 TABLET ORAL DAILY
Status: DISCONTINUED | OUTPATIENT
Start: 2020-01-01 | End: 2020-01-01

## 2020-01-01 RX ORDER — POLYETHYLENE GLYCOL 3350 17 G/17G
17 POWDER, FOR SOLUTION ORAL DAILY PRN
Status: DISCONTINUED | OUTPATIENT
Start: 2020-01-01 | End: 2020-01-01

## 2020-01-01 RX ORDER — PANTOPRAZOLE SODIUM 40 MG/1
40 TABLET, DELAYED RELEASE ORAL
Status: DISCONTINUED | OUTPATIENT
Start: 2020-01-01 | End: 2020-01-01

## 2020-01-01 RX ORDER — MORPHINE SULFATE 4 MG/ML
4 INJECTION, SOLUTION INTRAMUSCULAR; INTRAVENOUS EVERY 10 MIN PRN
Status: DISCONTINUED | OUTPATIENT
Start: 2020-01-01 | End: 2020-01-01 | Stop reason: HOSPADM

## 2020-01-01 RX ORDER — SODIUM CHLORIDE 0.9 % (FLUSH) 0.9 %
3 SYRINGE (ML) INJECTION AS NEEDED
Status: DISCONTINUED | OUTPATIENT
Start: 2020-01-01 | End: 2020-01-01

## 2020-01-01 RX ORDER — PHENYLEPHRINE HCL 10 MG/ML
VIAL (ML) INJECTION AS NEEDED
Status: DISCONTINUED | OUTPATIENT
Start: 2020-01-01 | End: 2020-01-01 | Stop reason: SURG

## 2020-01-01 RX ORDER — HYDROMORPHONE HYDROCHLORIDE 1 MG/ML
0.4 INJECTION, SOLUTION INTRAMUSCULAR; INTRAVENOUS; SUBCUTANEOUS EVERY 5 MIN PRN
Status: DISCONTINUED | OUTPATIENT
Start: 2020-01-01 | End: 2020-01-01 | Stop reason: HOSPADM

## 2020-01-01 RX ORDER — PANTOPRAZOLE SODIUM 40 MG/1
TABLET, DELAYED RELEASE ORAL
Qty: 90 TABLET | Refills: 0 | Status: SHIPPED | OUTPATIENT
Start: 2020-01-01 | End: 2020-01-01

## 2020-01-01 RX ORDER — AMOXICILLIN 500 MG/1
500 TABLET, FILM COATED ORAL 2 TIMES DAILY
Qty: 10 TABLET | Refills: 0 | Status: SHIPPED | OUTPATIENT
Start: 2020-01-01 | End: 2020-01-01

## 2020-01-01 RX ORDER — BISACODYL 10 MG
10 SUPPOSITORY, RECTAL RECTAL
Status: DISCONTINUED | OUTPATIENT
Start: 2020-01-01 | End: 2020-01-01

## 2020-01-01 RX ORDER — PANTOPRAZOLE SODIUM 40 MG/1
TABLET, DELAYED RELEASE ORAL
Qty: 90 TABLET | Refills: 0 | Status: SHIPPED | OUTPATIENT
Start: 2020-01-01

## 2020-01-01 RX ORDER — SODIUM CHLORIDE 9 MG/ML
INJECTION, SOLUTION INTRAVENOUS ONCE
Status: COMPLETED | OUTPATIENT
Start: 2020-01-01 | End: 2020-01-01

## 2020-01-01 RX ORDER — CIPROFLOXACIN 500 MG/1
500 TABLET, FILM COATED ORAL ONCE
Status: COMPLETED | OUTPATIENT
Start: 2020-01-01 | End: 2020-01-01

## 2020-01-01 RX ORDER — SODIUM CHLORIDE 9 MG/ML
INJECTION, SOLUTION INTRAVENOUS ONCE
Status: DISCONTINUED | OUTPATIENT
Start: 2020-01-01 | End: 2020-01-01

## 2020-01-01 RX ORDER — HEPARIN SODIUM 5000 [USP'U]/ML
5000 INJECTION, SOLUTION INTRAVENOUS; SUBCUTANEOUS EVERY 12 HOURS SCHEDULED
Status: DISCONTINUED | OUTPATIENT
Start: 2020-01-01 | End: 2020-01-01

## 2020-01-01 RX ORDER — MORPHINE SULFATE 4 MG/ML
2 INJECTION, SOLUTION INTRAMUSCULAR; INTRAVENOUS ONCE
Status: COMPLETED | OUTPATIENT
Start: 2020-01-01 | End: 2020-01-01

## 2020-01-01 RX ORDER — HYDROMORPHONE HYDROCHLORIDE 1 MG/ML
0.2 INJECTION, SOLUTION INTRAMUSCULAR; INTRAVENOUS; SUBCUTANEOUS EVERY 5 MIN PRN
Status: DISCONTINUED | OUTPATIENT
Start: 2020-01-01 | End: 2020-01-01 | Stop reason: HOSPADM

## 2020-01-01 RX ORDER — MORPHINE SULFATE 2 MG/ML
2 INJECTION, SOLUTION INTRAMUSCULAR; INTRAVENOUS EVERY 4 HOURS PRN
Status: DISCONTINUED | OUTPATIENT
Start: 2020-01-01 | End: 2020-01-01

## 2020-01-01 RX ORDER — FUROSEMIDE 20 MG/1
TABLET ORAL
Qty: 45 TABLET | Refills: 0 | Status: SHIPPED | OUTPATIENT
Start: 2020-01-01 | End: 2020-01-01

## 2020-01-01 RX ORDER — MORPHINE SULFATE 4 MG/ML
INJECTION, SOLUTION INTRAMUSCULAR; INTRAVENOUS
Status: COMPLETED
Start: 2020-01-01 | End: 2020-01-01

## 2020-01-01 RX ORDER — ONDANSETRON 2 MG/ML
INJECTION INTRAMUSCULAR; INTRAVENOUS AS NEEDED
Status: DISCONTINUED | OUTPATIENT
Start: 2020-01-01 | End: 2020-01-01 | Stop reason: SURG

## 2020-01-01 RX ORDER — ETOMIDATE 2 MG/ML
0.1 INJECTION INTRAVENOUS AS NEEDED
Status: DISCONTINUED | OUTPATIENT
Start: 2020-01-01 | End: 2020-01-01

## 2020-01-01 RX ORDER — NALOXONE HYDROCHLORIDE 0.4 MG/ML
0.08 INJECTION, SOLUTION INTRAMUSCULAR; INTRAVENOUS; SUBCUTANEOUS
Status: DISCONTINUED | OUTPATIENT
Start: 2020-01-01 | End: 2020-01-01

## 2020-01-01 RX ORDER — SODIUM CHLORIDE, SODIUM LACTATE, POTASSIUM CHLORIDE, CALCIUM CHLORIDE 600; 310; 30; 20 MG/100ML; MG/100ML; MG/100ML; MG/100ML
INJECTION, SOLUTION INTRAVENOUS CONTINUOUS
Status: DISCONTINUED | OUTPATIENT
Start: 2020-01-01 | End: 2020-01-01 | Stop reason: HOSPADM

## 2020-01-01 RX ORDER — NITROGLYCERIN 0.4 MG/1
0.4 TABLET SUBLINGUAL EVERY 5 MIN PRN
Status: DISCONTINUED | OUTPATIENT
Start: 2020-01-01 | End: 2020-01-01

## 2020-01-01 RX ORDER — FUROSEMIDE 20 MG/1
20 TABLET ORAL EVERY 24 HOURS
COMMUNITY

## 2020-01-01 RX ORDER — CEPHALEXIN 500 MG/1
500 CAPSULE ORAL 4 TIMES DAILY
Qty: 20 CAPSULE | Refills: 0 | Status: SHIPPED | OUTPATIENT
Start: 2020-01-01 | End: 2020-01-01

## 2020-01-01 RX ORDER — MORPHINE SULFATE 4 MG/ML
2 INJECTION, SOLUTION INTRAMUSCULAR; INTRAVENOUS EVERY 10 MIN PRN
Status: DISCONTINUED | OUTPATIENT
Start: 2020-01-01 | End: 2020-01-01 | Stop reason: HOSPADM

## 2020-01-01 RX ORDER — MORPHINE SULFATE 4 MG/ML
4 INJECTION, SOLUTION INTRAMUSCULAR; INTRAVENOUS EVERY 2 HOUR PRN
Status: DISCONTINUED | OUTPATIENT
Start: 2020-01-01 | End: 2020-01-01

## 2020-01-01 RX ORDER — DEXAMETHASONE SODIUM PHOSPHATE 4 MG/ML
VIAL (ML) INJECTION AS NEEDED
Status: DISCONTINUED | OUTPATIENT
Start: 2020-01-01 | End: 2020-01-01 | Stop reason: SURG

## 2020-01-01 RX ORDER — FUROSEMIDE 20 MG/1
20 TABLET ORAL DAILY
Status: DISCONTINUED | OUTPATIENT
Start: 2020-01-01 | End: 2020-01-01

## 2020-01-01 RX ORDER — HYDROCODONE BITARTRATE AND ACETAMINOPHEN 5; 325 MG/1; MG/1
1 TABLET ORAL AS NEEDED
Status: DISCONTINUED | OUTPATIENT
Start: 2020-01-01 | End: 2020-01-01 | Stop reason: HOSPADM

## 2020-01-01 RX ORDER — ATORVASTATIN CALCIUM 20 MG/1
TABLET, FILM COATED ORAL
Qty: 90 TABLET | Refills: 0 | OUTPATIENT
Start: 2020-01-01

## 2020-01-01 RX ORDER — SODIUM CHLORIDE, SODIUM LACTATE, POTASSIUM CHLORIDE, CALCIUM CHLORIDE 600; 310; 30; 20 MG/100ML; MG/100ML; MG/100ML; MG/100ML
INJECTION, SOLUTION INTRAVENOUS CONTINUOUS PRN
Status: DISCONTINUED | OUTPATIENT
Start: 2020-01-01 | End: 2020-01-01 | Stop reason: SURG

## 2020-01-01 RX ORDER — FUROSEMIDE 20 MG/1
20 TABLET ORAL DAILY
Qty: 90 TABLET | Refills: 0 | Status: ON HOLD | OUTPATIENT
Start: 2020-01-01 | End: 2020-01-01

## 2020-01-01 RX ORDER — ONDANSETRON 2 MG/ML
4 INJECTION INTRAMUSCULAR; INTRAVENOUS ONCE AS NEEDED
Status: DISCONTINUED | OUTPATIENT
Start: 2020-01-01 | End: 2020-01-01 | Stop reason: HOSPADM

## 2020-01-01 RX ORDER — LISINOPRIL 5 MG/1
5 TABLET ORAL DAILY
Status: DISCONTINUED | OUTPATIENT
Start: 2020-01-01 | End: 2020-01-01

## 2020-01-01 RX ORDER — LIDOCAINE HYDROCHLORIDE 10 MG/ML
INJECTION, SOLUTION EPIDURAL; INFILTRATION; INTRACAUDAL; PERINEURAL AS NEEDED
Status: DISCONTINUED | OUTPATIENT
Start: 2020-01-01 | End: 2020-01-01 | Stop reason: SURG

## 2020-01-01 RX ORDER — ACETAMINOPHEN 325 MG/1
650 TABLET ORAL EVERY 6 HOURS PRN
Qty: 40 TABLET | Refills: 0 | Status: SHIPPED | COMMUNITY
Start: 2020-01-01

## 2020-01-01 RX ADMIN — PHENYLEPHRINE HCL 100 MCG: 10 MG/ML VIAL (ML) INJECTION at 12:37:00

## 2020-01-01 RX ADMIN — SODIUM CHLORIDE, SODIUM LACTATE, POTASSIUM CHLORIDE, CALCIUM CHLORIDE: 600; 310; 30; 20 INJECTION, SOLUTION INTRAVENOUS at 12:45:00

## 2020-01-01 RX ADMIN — LIDOCAINE HYDROCHLORIDE 50 MG: 10 INJECTION, SOLUTION EPIDURAL; INFILTRATION; INTRACAUDAL; PERINEURAL at 11:59:00

## 2020-01-01 RX ADMIN — CIPROFLOXACIN 500 MG: 500 TABLET, FILM COATED ORAL at 09:50:00

## 2020-01-01 RX ADMIN — PHENYLEPHRINE HCL 100 MCG: 10 MG/ML VIAL (ML) INJECTION at 12:39:00

## 2020-01-01 RX ADMIN — ONDANSETRON 4 MG: 2 INJECTION INTRAMUSCULAR; INTRAVENOUS at 12:43:00

## 2020-01-01 RX ADMIN — PHENYLEPHRINE HCL 100 MCG: 10 MG/ML VIAL (ML) INJECTION at 12:19:00

## 2020-01-01 RX ADMIN — SODIUM CHLORIDE, SODIUM LACTATE, POTASSIUM CHLORIDE, CALCIUM CHLORIDE: 600; 310; 30; 20 INJECTION, SOLUTION INTRAVENOUS at 11:54:00

## 2020-01-01 RX ADMIN — PHENYLEPHRINE HCL 100 MCG: 10 MG/ML VIAL (ML) INJECTION at 12:15:00

## 2020-01-01 RX ADMIN — PHENYLEPHRINE HCL 100 MCG: 10 MG/ML VIAL (ML) INJECTION at 12:29:00

## 2020-01-01 RX ADMIN — DEXAMETHASONE SODIUM PHOSPHATE 4 MG: 4 MG/ML VIAL (ML) INJECTION at 12:07:00

## 2020-01-05 NOTE — ED PROVIDER NOTES
Patient Seen in: [facility]      History   Patient presents with:  Wrist Pain    Stated Complaint: R wrist pain     HPI    80year-old male presents for evaluation of right wrist pain.   Patient with right wrist pain and s rigidity. Cardiovascular:      Rate and Rhythm: Normal rate. Pulmonary:      Effort: Pulmonary effort is normal. No respiratory distress. Musculoskeletal: Normal range of motion.       Comments: Swelling over the distal ulna, tender to palpation, norm

## 2020-01-05 NOTE — ED INITIAL ASSESSMENT (HPI)
PATIENT WITH RIGHT WRIST PAIN AND SWELLING X 2 WEEKS. PATIENT DENIES FALL BUT IS A POOR HISTORIAN BY A FAMILY MEMBER.

## 2020-01-07 NOTE — PATIENT INSTRUCTIONS
The rest your right wrist as needed in the splint during the day. Remove the splint 2-3 times a day to work on gentle wrist range of motion. Continue to ice and elevate your wrist to reduce swelling.   Apply capsaicin cream as needed to your wrist for venu

## 2020-01-08 NOTE — TELEPHONE ENCOUNTER
Pt's daughter called. Pt had appointment 1-7-20. Pt has called the pharm to check on the rx. But was told it was not ready. To call the doctors office. Caller does not understand. Does rx. Need to changed.   Please call

## 2020-01-08 NOTE — H&P
NURSING INTAKE COMMENTS: Patient presents with:  Consult: C/o right wrist pain since 12/29/19. Went to urgent care on 1/5 where xray right wrist was completed. Was given a wrist brace with relief.       HPI: This 80year old male presents today with compl stomach   • Diabetes Mother    • Breast Cancer Daughter        Social History    Occupational History      Not on file    Tobacco Use      Smoking status: Never Smoker      Smokeless tobacco: Never Used    Substance and Sexual Activity      Alcohol use flexion. Imaging: Xr Wrist Complete (min 3 Views), Right (cpt=73110)    Result Date: 1/5/2020  PROCEDURE: XR WRIST COMPLETE (MIN 3 VIEWS), RIGHT (CPT=73110)  COMPARISON: None. INDICATIONS: Right wrist pain and swelling for 2 weeks. No known trauma.   TE Plan:  Diagnoses and all orders for this visit:    Posttraumatic arthritis of wrist, right    Other orders  -     Diclofenac Sodium 1 % Transdermal Gel; Apply 2 g topically 4 (four) times daily as needed.         Assessment: 80year-old gentleman with right

## 2020-01-09 NOTE — TELEPHONE ENCOUNTER
Kamryn Wing from [place] called and asking if pt has any allergies or sensitivities to diclofenac sodium gel. Informed Janeal that no allergies are listed and no sensitivities are listed.  Danisha verbalized under

## 2020-01-09 NOTE — TELEPHONE ENCOUNTER
Called [phone] as instructed in Francine' prior authorization request. S/w Erick Soliz. PA initiated. Response to be received within 72 hours.

## 2020-01-09 NOTE — TELEPHONE ENCOUNTER
Left message for pt's dtr that we are waiting on authorization for medication, will know answer w/in 72 hrs. pls call back with questions.

## 2020-01-25 ENCOUNTER — ANCILLARY PROCEDURE (OUTPATIENT)
Dept: CARDIOLOGY | Age: 85
End: 2020-01-25
Attending: INTERNAL MEDICINE

## 2020-01-25 DIAGNOSIS — Z95.810 ICD (IMPLANTABLE CARDIOVERTER-DEFIBRILLATOR) IN PLACE: ICD-10-CM

## 2020-02-25 NOTE — PROGRESS NOTES
Mansoor Merrill is a 80year old male. Patient presents with:  Suture Removal  Bunions: L-foot pain getting worse    HPI:   26-year-old gentleman with a past medical history of cardiac arrhythmia status post defibrillator, hypertension here after 1 year for ashraf tobacco: Never Used    Alcohol use:  Yes      Alcohol/week: 2.0 standard drinks      Types: 2 Glasses of wine per week    Drug use: No     Family History   Problem Relation Age of Onset   • Cancer Father         stomach   • Diabetes Mother    • Breast Cance current regimen. Plan: As above. I encouraged him to follow-up with nephrology and cardiology. I will see him back in couple of months for a physical.      The patient indicates understanding of these issues and agrees to the plan.   No follow-ups on f

## 2020-02-26 RX ORDER — CARVEDILOL 6.25 MG/1
6.25 TABLET ORAL 2 TIMES DAILY WITH MEALS
Qty: 180 TABLET | Refills: 0 | Status: SHIPPED | OUTPATIENT
Start: 2020-02-26 | End: 2020-02-27 | Stop reason: SDUPTHER

## 2020-02-27 ENCOUNTER — TELEPHONE (OUTPATIENT)
Dept: CARDIOLOGY | Age: 85
End: 2020-02-27

## 2020-02-27 DIAGNOSIS — E78.1 HYPERGLYCERIDEMIA, PURE: Primary | ICD-10-CM

## 2020-02-27 RX ORDER — AMIODARONE HYDROCHLORIDE 200 MG/1
200 TABLET ORAL DAILY
Qty: 90 TABLET | Refills: 0 | Status: SHIPPED | OUTPATIENT
Start: 2020-02-27 | End: 2020-05-22

## 2020-02-27 RX ORDER — ATORVASTATIN CALCIUM 20 MG/1
20 TABLET, FILM COATED ORAL DAILY
Qty: 90 TABLET | Refills: 0 | Status: SHIPPED | OUTPATIENT
Start: 2020-02-27 | End: 2020-05-26

## 2020-02-27 RX ORDER — CARVEDILOL 6.25 MG/1
6.25 TABLET ORAL 2 TIMES DAILY WITH MEALS
Qty: 180 TABLET | Refills: 0 | Status: SHIPPED | OUTPATIENT
Start: 2020-02-27 | End: 2020-05-27

## 2020-02-27 NOTE — PROGRESS NOTES
HPI:    Patient ID: Salma Fong is a 80year old male. This 80-year-old male presents to me today as a new patient. He is accompanied and referred by his daughter.   I did surgery on his daughter's bunion and she is wondering what can be done for her dad motion is with some restriction based on age probably some degree of arthritis. I discussed and reviewed the etiology, progression, and considerations of care.   Although his daughter thought he might be able to have surgery I would not recommend it based

## 2020-03-09 ENCOUNTER — ANCILLARY PROCEDURE (OUTPATIENT)
Dept: CARDIOLOGY | Age: 85
End: 2020-03-09
Attending: INTERNAL MEDICINE

## 2020-03-09 VITALS
RESPIRATION RATE: 14 BRPM | DIASTOLIC BLOOD PRESSURE: 56 MMHG | SYSTOLIC BLOOD PRESSURE: 104 MMHG | HEART RATE: 60 BPM | WEIGHT: 174 LBS | BODY MASS INDEX: 28.96 KG/M2

## 2020-03-09 DIAGNOSIS — Z45.018 PACEMAKER REPROGRAMMING/CHECK: Primary | ICD-10-CM

## 2020-03-09 LAB
ALT SERPL-CCNC: 85 U/L
AST SERPL-CCNC: 83 U/L
CHOLEST SERPL-MCNC: 114 MG/DL
CHOLEST/HDLC SERPL: NORMAL {RATIO}
HDLC SERPL-MCNC: 45 MG/DL
LDLC SERPL CALC-MCNC: 52 MG/DL
LENGTH OF FAST TIME PATIENT: NORMAL H
NONHDLC SERPL-MCNC: 69 MG/DL
TRIGL SERPL-MCNC: 85 MG/DL
TSH: 0.18
VLDLC SERPL CALC-MCNC: NORMAL MG/DL

## 2020-03-09 PROCEDURE — 93283 PRGRMG EVAL IMPLANTABLE DFB: CPT | Performed by: INTERNAL MEDICINE

## 2020-03-10 ENCOUNTER — CLINICAL ABSTRACT (OUTPATIENT)
Dept: CARDIOLOGY | Age: 85
End: 2020-03-10

## 2020-03-10 ENCOUNTER — TELEPHONE (OUTPATIENT)
Dept: CARDIOLOGY | Age: 85
End: 2020-03-10

## 2020-03-10 DIAGNOSIS — I50.22 CHRONIC SYSTOLIC HF (HEART FAILURE) (CMD): ICD-10-CM

## 2020-03-10 DIAGNOSIS — I25.5 ISCHEMIC CARDIOMYOPATHY: Primary | ICD-10-CM

## 2020-03-10 DIAGNOSIS — I25.10 CORONARY ARTERY DISEASE INVOLVING NATIVE CORONARY ARTERY OF NATIVE HEART WITHOUT ANGINA PECTORIS: ICD-10-CM

## 2020-03-11 ENCOUNTER — APPOINTMENT (OUTPATIENT)
Dept: CARDIOLOGY | Age: 85
End: 2020-03-11

## 2020-03-11 NOTE — TELEPHONE ENCOUNTER
Spoke with daughter Vani White ( verified)--requesting Dr. Jeanie Back to complete form for handicapped placard for patient. LOV 2020    She requests return call when form is ready for .     Please respond to pool: VERNON LARA CLINICAL STAFF

## 2020-03-12 NOTE — TELEPHONE ENCOUNTER
Stable kidney function. Urine negative for protein     UA looks infected.  Get urine culture - ordered

## 2020-03-13 NOTE — TELEPHONE ENCOUNTER
Daughter Suzzanne Leventhal returned call. Results message read. Daughter is aware to do additional urine testing. Dr. Cristopher Bullock ordered test. No fasting is needed.

## 2020-03-16 ENCOUNTER — APPOINTMENT (OUTPATIENT)
Dept: CARDIOLOGY | Age: 85
End: 2020-03-16

## 2020-03-16 NOTE — TELEPHONE ENCOUNTER
Contacted Evelio Washington and notified her of Dr. Nohemi Garcia's results message and instructions below.

## 2020-03-16 NOTE — TELEPHONE ENCOUNTER
Spoke to daughter, eJrmain Mcgee. Notified her of Dr. Omar Garcia's results message and instructions below. Rx still showing \"transmission in progress. \" Called pharmacy and they did receive rx. Not ready yet but will be getting ready for pt.

## 2020-03-17 NOTE — TELEPHONE ENCOUNTER
Talked to lab. I did not call patient or order for any more testing. Patient need to see endocrinology.

## 2020-03-17 NOTE — TELEPHONE ENCOUNTER
Patient's daughter is with him at the lab for blood work and there is no order in the system.  Patient is currently at the [facility]

## 2020-03-17 NOTE — TELEPHONE ENCOUNTER
Patient daughter calling  Cannot get appt until end of May     Suggested to call in a few days to see if Endo has any new appts.

## 2020-03-17 NOTE — TELEPHONE ENCOUNTER
Latesha Chase from the phone room called stating that pt is at the lab at the moment and is wanting to get blood work done but there are no orders in the system   Please advise

## 2020-03-17 NOTE — TELEPHONE ENCOUNTER
Pt's daughter has been informed, provided Dr. Lou Side number and was informed to make appt with whoever can see him first.

## 2020-03-18 PROCEDURE — 93283 PRGRMG EVAL IMPLANTABLE DFB: CPT | Performed by: INTERNAL MEDICINE

## 2020-04-22 NOTE — TELEPHONE ENCOUNTER
Talked to daughter , if symptoms worse , she will take him to ER  Please schedule for OV for this Friday     Audrey Mealing

## 2020-04-22 NOTE — TELEPHONE ENCOUNTER
Please reply to pool: EM RN TRIAGE    Action Requested: Summary for Provider     []  Critical Lab, Recommendations Needed  [x] Need Additional Advice  []   FYI    [x]   Need Orders  [] Need Medications Sent to Pharmacy  []  Other     SUMMARY: Agrees with

## 2020-05-01 NOTE — TELEPHONE ENCOUNTER
Daughter inquiring if pt needs labs or imaging test done prior to appt 6/2/20.  Please call [phone]

## 2020-05-01 NOTE — TELEPHONE ENCOUNTER
Will evaluate the patient that day and decide.      He has few other appointments, will try to coordinate so he doesn't have to come in for multiple blood draws

## 2020-05-04 NOTE — PROGRESS NOTES
Nuris Sin is a 80year old male. Patient presents with:  Vomiting  Dementia  Swelling: left hand  Sleep Problem: excessive sleeping    HPI:   80year-old gentleman is accompanied by his daughter for vague complaints.   She reports that she thought his mccray Smoking status: Never Smoker      Smokeless tobacco: Never Used    Alcohol use:  Yes      Alcohol/week: 2.0 standard drinks      Types: 2 Glasses of wine per week    Drug use: No     Family History   Problem Relation Age of Onset   • Cancer Father         s in light of acidity, I have given a trial of PPI. 2. Swelling of joint of left wrist  Range of motion left wrist is intact. There is no swelling. I reassured    3.  Dementia without behavioral disturbance, unspecified dementia type ([facility] [address]  I discussed w

## 2020-05-05 ENCOUNTER — ADVANCED DIRECTIVES (OUTPATIENT)
Dept: HEALTH INFORMATION MANAGEMENT | Age: 85
End: 2020-05-05

## 2020-05-22 RX ORDER — AMIODARONE HYDROCHLORIDE 200 MG/1
200 TABLET ORAL DAILY
Qty: 90 TABLET | Refills: 0 | Status: SHIPPED | OUTPATIENT
Start: 2020-05-22 | End: 2020-08-20

## 2020-05-22 NOTE — TELEPHONE ENCOUNTER
Last seen 11/18/19. RTC in 6 mos (5/2020) F/U scheduled for 6/2/2020. Medication noted as taking. Refill pended and routed to Dr. Job Benitez.

## 2020-05-26 RX ORDER — ATORVASTATIN CALCIUM 20 MG/1
20 TABLET, FILM COATED ORAL DAILY
Qty: 90 TABLET | Refills: 1 | Status: SHIPPED | OUTPATIENT
Start: 2020-05-26 | End: 2020-11-22

## 2020-06-01 PROBLEM — E05.90 HYPERTHYROIDISM: Status: ACTIVE | Noted: 2020-01-01

## 2020-06-01 PROBLEM — S22.41XD CLOSED FRACTURE OF MULTIPLE RIBS OF RIGHT SIDE WITH ROUTINE HEALING: Status: RESOLVED | Noted: 2019-02-11 | Resolved: 2020-01-01

## 2020-06-01 PROBLEM — N18.30 STAGE 3 CHRONIC KIDNEY DISEASE (HCC): Status: ACTIVE | Noted: 2020-01-01

## 2020-06-01 PROBLEM — I42.0 DILATED CARDIOMYOPATHY (HCC): Status: ACTIVE | Noted: 2020-01-01

## 2020-06-01 NOTE — PROGRESS NOTES
HPI:   Omkar Asencio is a 80year old male who presents for a Medicare Subsequent Annual Wellness visit (Pt already had Initial Annual Wellness).     55-year-old gentleman with a past medical history of dilated cardiomyopathy, ventricular arrhythmias status with Memory based on screening of functional status. Memory Problems?: Yes   Patient does not do grocery shopping or driving. He has been under 24/7 care with his family. She does have hearing problems and he does not like to wear hearing aids.     Depr Last Chemistry Labs:   Lab Results   Component Value Date    AST 83 (H) 03/09/2020    ALT 85 (H) 03/09/2020    CA 8.9 03/06/2020    ALB 2.6 (L) 03/06/2020    TSH 0.163 (L) 03/13/2020    CREATSERUM 1.31 (H) 03/06/2020     (H) 03/06/2020 alcohol use of about 2.0 standard drinks of alcohol per week. He reports that he does not use drugs. REVIEW OF SYSTEMS:   Review of Systems   Constitutional: Negative for appetite change, fever and unexpected weight change.    HENT: Negative for congest confused about where sounds come from:  Yes I misunderstand some words in a sentence and need to ask people to repeat themselves:  Yes   I especially have trouble understanding the speech of women and children:  Yes I have trouble understanding the speaker 10/06/2015, 02/19/2016, 10/08/2016, 09/25/2017   • Influenza 10/08/2010, 10/15/2012   • Pneumococcal (Prevnar 13) 10/06/2015   • TDAP 02/17/2020   Pended Date(s) Pended   • Pneumovax 23 10/24/2019, 06/01/2020        ASSESSMENT AND OTHER RELEVANT CHRONIC CO regarding the importance of healthcare power of . His kids have difference of opinion. I encouraged them to come up with a solution for his healthcare power of  and to address his CODE STATUS.       Diet assessment: fair     PLAN:  The kala data found. Prostate Cancer Screening      PSA  Annually There are no preventive care reminders to display for this patient.   Update Health Maintenance if applicable     Immunizations (Update Immunization Activity if applicable)     Influenza  Covered A

## 2020-06-08 NOTE — PATIENT INSTRUCTIONS
Daily weights   Low salt diet ( 2 gms a day) and restrict water intake to 45-50 ounces a day   Lab tests as ordered in one week   Take furosemide 40 mg for 3 days - then go back to 20 mg daily   Call me in one week with home weights and leg swelling   Fo

## 2020-06-08 NOTE — PROGRESS NOTES
Progress Note:      Patient is a 80 yrs old male with pmh of CAD s/p PCI, ischemic CMP/CHF, hearing loss, A-fib not on anticoagulation and s/p defibrillator in 11/2018 after cardiogenic shock and EF declined from 40 -> 25%, HTN who presented for follow • PACEMAKER/DEFIBRILLATOR  11/2018   • PT W/ CORONARY ARTERY STENT  2000      Family History   Problem Relation Age of Onset   • Cancer Father         stomach   • Diabetes Mother    • Breast Cancer Daughter       Social History: Social History    Tobacco bruising  Integumentary:  Negative for pruritus and rash  Musculoskeletal:  Negative for bone/joint symptoms  Neurological:  Negative for gait disturbance  Psychiatric:  Negative for inappropriate interaction        06/08/20  1127   BP: 104/57   Pulse: 63 call in one week with home weights and leg swelling  - cont. coreg    Discussed with daughter .  Lab tests in one week     Follow up in 2- 3 months      Orders This Visit:  Orders Placed This Encounter      Renal Function Panel      Meds This Visit:  Request

## 2020-06-15 NOTE — TELEPHONE ENCOUNTER
Patient  Banner Welsh has thyroiditis  Thyroid labs still slightly abnormal, but seem to be improving  We will have to watch for development of hypothyroidism  Please review symptoms  Repeat labs in 5-6 weeks  TSH, Free T4 and Free T3  Call for results  Than

## 2020-06-15 NOTE — H&P
New Patient Evaluation - History and Physical    CONSULT - Reason for Visit:  Hyperthyroidism   Requesting Physician: Dr. Maricruz Fisher:  Patient presents with:  Consult: Pt states he had some blood work done a few months ago and stated the th total) by mouth daily. 90 tablet 1   • atorvastatin 20 MG Oral Tab   3   • carvedilol (COREG) 6.25 MG Oral Tab Take 6.25 mg by mouth 2 (two) times daily with meals.        • Diclofenac Sodium 1 % Transdermal Gel Apply 2 g topically 4 (four) times daily as n 173 lb 9.6 oz (78.7 kg)   Height: 5' 4\" (1.626 m)     BMI: Body mass index is 29.8 kg/m².      CONSTITUTIONAL:  awake, alert, cooperative, no apparent distress, and appears stated age  PSYCH: normal affect  EYES:  No proptosis, no ptosis, conjunctiva juanis

## 2020-06-22 NOTE — TELEPHONE ENCOUNTER
Spoke with daughter Anni Correia regarding pt lab results and MD recommendations. Pt will get his labs drawn in 5-6 weeks.

## 2020-06-24 NOTE — TELEPHONE ENCOUNTER
Maximiliano Brown received a call from pt daughter Demetrio Epperson. She stated that pt was in to see you on June 1 and you had wanted her to call you if there was any chances with patient. Patient throw up today in the morning at 7 am  and it was a lot.  Pt then went to

## 2020-06-24 NOTE — PROGRESS NOTES
Omar Ovalle is a 80year old male. No chief complaint on file. Virtual/Telephone Check-In    Omar Ovalle verbally consents to a Virtual/Telephone Check-In service on 06/24/20.   Patient has been referred to the [facility] website at [email or web address] to mouth 2 (two) times daily with meals.           Past Medical History:   Diagnosis Date   • B12 deficiency 2012   • Closed fracture of multiple ribs of right side with routine healing 2/11/2019   • Disorder of coccyx 5/20/2013   • Fracture of arm 1979   • He PLAN:   1. Bilious vomiting with nausea   -I have sent Zofran to the pharmacy. Encourage patient to continue with fluids and start brat diet for today. In the absence of abdominal pain or other systemic signs, I think it is okay to watch him at home.   In

## 2020-06-24 NOTE — TELEPHONE ENCOUNTER
DAughter was called and informed of Dr. Lauren Mak message below. Daughter Rhodia Essex stated that she will not be there with pt but her sister Rosmery Benton will be with pt. Please call [phone]

## 2020-07-01 NOTE — TELEPHONE ENCOUNTER
daughter calling to state that pts weight is fine and his ankles are slightly swollen still  - he is not drinking more than 45 oz a day - he is taking water pill everyday - calling to update Dr after 6/8 visit

## 2020-07-02 NOTE — TELEPHONE ENCOUNTER
Noted  Ankle swelling is unlikely thyroid related  Suggest calling PCP about swelling  As discussed before, we are monitoring his thyroid function  Please repeat TSH,Free T4 and Free T3 in 2-3 weeks  Thanks

## 2020-07-03 NOTE — TELEPHONE ENCOUNTER
What are home weights ?  Any breathing issues     Are you watching sodium in diet and taking lasix regularly

## 2020-07-03 NOTE — TELEPHONE ENCOUNTER
Daughter Elvis Valdivia was contacted and relayed below message as outlined. She states she might have called the wrong department and meant to call nephrologist.  She voiced understanding about thyroid levels being repeated in 2-3 weeks.   She asked for Dr. Dane Shaikh

## 2020-07-03 NOTE — TELEPHONE ENCOUNTER
Benny Kimbrough called in for the patient to advise that the patient is continuing to take the water pill's but his ankles are swollen( not too bad). Everything else is fine with the patient.  Please advise

## 2020-07-13 NOTE — TELEPHONE ENCOUNTER
Spoke with pt's daughter Tom Pruitt), pt  verified. Pt informed of MD recommendation, she stated pt had episode of vomiting once every 3 weeks. She will continue to monitor pt for now and she will discuss further on pt's next visit.           Future Appo

## 2020-07-13 NOTE — TELEPHONE ENCOUNTER
Pantoprazole cannot be increased more than 40 mg. If Zofran is not covered with his insurance, we can try Reglan 5 mg every 8 hours as needed for nausea.   If he is only having episodes of emesis once in a while, we do not need to be on any medications eit

## 2020-07-13 NOTE — TELEPHONE ENCOUNTER
Spoke with the patient's daughter named Yash Camargo who is on HIPAA. Daughter reports the patient had a virtual visit on 6/24/20 with Dr. Jeff Booth due to vomiting. Daughter reports the patient was prescribed pantoprazole 40mg QHS which has helped.  Daughter report

## 2020-07-18 PROBLEM — I50.22 CHRONIC SYSTOLIC CONGESTIVE HEART FAILURE (HCC): Status: ACTIVE | Noted: 2020-01-01

## 2020-07-18 PROBLEM — R10.9 RIGHT SIDED ABDOMINAL PAIN: Status: ACTIVE | Noted: 2020-01-01

## 2020-07-18 PROBLEM — N17.9 AKI (ACUTE KIDNEY INJURY) (HCC): Status: ACTIVE | Noted: 2020-01-01

## 2020-07-18 PROBLEM — K82.8 GALLBLADDER SLUDGE: Status: ACTIVE | Noted: 2020-01-01

## 2020-07-18 PROBLEM — K74.60 CIRRHOSIS OF LIVER WITH ASCITES: Status: ACTIVE | Noted: 2020-01-01

## 2020-07-18 PROBLEM — R18.8 CIRRHOSIS OF LIVER WITH ASCITES: Status: ACTIVE | Noted: 2020-01-01

## 2020-07-18 PROBLEM — N20.1 URETEROLITHIASIS: Status: ACTIVE | Noted: 2020-01-01

## 2020-07-18 PROBLEM — N13.2 HYDRONEPHROSIS WITH URINARY OBSTRUCTION DUE TO RENAL CALCULUS: Status: ACTIVE | Noted: 2020-01-01

## 2020-07-18 PROBLEM — R11.2 NON-INTRACTABLE VOMITING WITH NAUSEA: Status: ACTIVE | Noted: 2020-01-01

## 2020-07-18 PROBLEM — R79.89 ELEVATED LFTS: Status: ACTIVE | Noted: 2020-01-01

## 2020-07-18 PROBLEM — R18.8 CIRRHOSIS OF LIVER WITH ASCITES (HCC): Status: ACTIVE | Noted: 2020-01-01

## 2020-07-18 PROBLEM — K74.60 CIRRHOSIS OF LIVER WITH ASCITES (HCC): Status: ACTIVE | Noted: 2020-01-01

## 2020-07-18 NOTE — CONSULTS
[facility]    PATIENT'S NAME: Elvis Abler PHYSICIAN: Ruddy Kimball MD   CONSULTING PHYSICIAN: Gokul Elias MD   PATIENT ACCOUNT#:   [id number]    LOCATION:  [address] [place] [address] Rd #:   [id number]       DATE OF BIRTH: He is a nonsmoker and lives with his family. FAMILY HISTORY:  Negative for renal pathology. REVIEW OF SYSTEMS:  The patient states his pain is still there, but it seems better, but has received narcotics.   Denied any chest pain or shortness of breath nephropathy. 2.   Midright ureteral calculus. 3.   Recurrent nausea and vomiting. Etiology to be determined.   4.   History of ischemic cardiomyopathy with a left ventricular ejection fraction of 20% to 25%, status post coronary artery bypass graft surge

## 2020-07-18 NOTE — PLAN OF CARE
Pt A/O 3. Forgetful. VSS. Tele. Pacemaker. Continuous pulse ox. 2L O2 overnight for desaturation when sleeping. Fluids infusing. Bed alarm on. Call light in reach. Bed locked in lowest position. Pt oriented to room.    Problem: Patient/Family Goals  Goal: P Notify MD/LIP if interventions unsuccessful or patient reports new pain  - Anticipate increased pain with activity and pre-medicate as appropriate  Outcome: Progressing     Problem: RISK FOR INFECTION - ADULT  Goal: Absence of fever/infection during antici Problem: CARDIOVASCULAR - ADULT  Goal: Maintains optimal cardiac output and hemodynamic stability  Description  INTERVENTIONS:  - Monitor vital signs, rhythm, and trends  - Monitor for bleeding, hypotension and signs of decreased cardiac output  - Evalua ordered and tolerated  - Nasogastric tube to low intermittent suction as ordered  - Evaluate effectiveness of ordered antiemetic medications  - Provide nonpharmacologic comfort measures as appropriate  - Advance diet as tolerated, if ordered  - Obtain nutr and/or skin breakdown  - Initiate interventions, skin care algorithm/standards of care as needed  Outcome: Progressing

## 2020-07-18 NOTE — ED PROVIDER NOTES
Patient Seen in: [facility] Emergency Department    History   Patient presents with:  Abdomen/Flank Pain  Nausea/Vomiting/Diarrhea    Stated Complaint: N/V/D     HPI    28-year-old male with past medical history of CAD status post CABG/PCI and defib topically 4 (four) times daily as needed. Patient not taking: Reported on 6/8/2020    aspirin 81 MG Oral Tab,  Take 81 mg by mouth. Multiple Vitamins-Minerals (MULTIVITAL PLATINUM SILVER) Oral Chew Tab,  Chew 1 tablet by mouth daily.        Family Histor Value    Glucose 120 (*)     BUN 31 (*)     Creatinine 1.74 (*)     Calculated Osmolality 296 (*)     GFR, Non- 33 (*)     GFR, -American 38 (*)     All other components within normal limits   HEPATIC FUNCTION PANEL (7) - Abnormal; N [id number]  Physician:  Charlene Felty  YOB: 1926    Past Medical History (entered by Technologist):  Hx of left sided defib  Reason For Exam (entered by Technologist):  Nausea and vomitting for several months with lower RLQ pain today Technologist):    Reason For Exam (entered by Technologist):  N/V/D.   Other Notes (entered by Technologist): ROOM 22/     Additional Information (per Vision Radiologist):        CT AP with contrast    Comparison: CT AP without contrast 11/30/20 [phone] - Phone    Angelika [id number]    NAME: Fahad Perez OF EXAM: 07/18/2020  Patient No:  [id number]  Physician:  Melodie Garcia  YOB: 1926    Past Medical History (entered by Technologist):    Reason For Exam (ente colitis.     Pulse ox: 98%:Normal on room air, as interpreted by myself     Evaluation for several weeks of nausea/vomiting now with acute onset of right-sided abdominal pain without peritonitic exam.  Labs notable for JUAN and transaminitis, CT notable for

## 2020-07-18 NOTE — H&P
[facility]    History and Physical    Marcellus Pretty Patient Status:  Inpatient    1926 MRN [id number]   Location [facility] 5SW/SE Attending Finn Murphy MD   Hosp Day # 0 PCP Regina Hooks MD     Date:  2020  Date o Daughter      Social History:  Social History    Tobacco Use      Smoking status: Never Smoker      Smokeless tobacco: Never Used    Alcohol use:  Yes      Alcohol/week: 2.0 standard drinks      Types: 2 Glasses of wine per week    Drug use: No    Allergies Normocephalic. Eyes: Pupils are equal, round, and reactive to light. Neck: Normal range of motion. Cardiovascular: Normal rate and regular rhythm. No murmur heard. Pulmonary/Chest: Effort normal and breath sounds normal. No respiratory distress. mild abdominal/pelvic ascites. Correlate with liver enzymes. Coarse dystrophic calcifications posterior segment right lobe of the liver unchanged.  2. Moderate right-sided hydroureteronephrosis secondary to a partially obstructing 7 x 4 mm calculus in the will order US, will follow GI , will order hep profile         Elevated LFTs- as above,     NAusea/vomiting- ?/ as per daughter been going on for few month now, will follow GI       **Certification      PHYSICIAN Certification of Need for Inpatient [facility]

## 2020-07-18 NOTE — CONSULTS
[facility]    PATIENT'S NAME: Rosaura Morton PHYSICIAN: Mil Bowen MD   CONSULTING PHYSICIAN: Emma Tabares MD   PATIENT ACCOUNT#:   [id number]    LOCATION:  [address] [place] [address] #:   [id number]       DATE OF BIRTH:  [birth year] mildly injected. Mucous membranes are moist.  NECK:  Supple without lymphadenopathy. LUNGS:  Clear anteriorly. HEART:  Regular. Pacemaker site in the left upper chest is well healed. ABDOMEN:  Soft and nontender.   I do not appreciate any masses or her not appear to have biliary colic-type symptoms. PLAN:  Await urology assessment and treatment as indicated. He is on IV antibiotics.   Once this acute issue is resolved, one might consider upper endoscopy to evaluate his nausea and vomiting and for cirr

## 2020-07-18 NOTE — ED NOTES
Orders for admission, patient is aware of plan and ready to go upstairs. Any questions, please call ED RN bozena  at extension [phone].

## 2020-07-18 NOTE — ED NOTES
Evia Boas. Pt was strait cathed for urine at [address] [place] [address]. Tolerated procedure well.

## 2020-07-19 PROCEDURE — 99222 1ST HOSP IP/OBS MODERATE 55: CPT | Performed by: INTERNAL MEDICINE

## 2020-07-19 NOTE — PROGRESS NOTES
[facility]  Nephrology Daily Progress Note    Carmen Le  [id number]  80year old      HPI:   Carmen Le is a 80year old male. Feeling well. Pain is minimal.  Up in chair eating well. No CP or SOB.       ROS:     Constitutional:  Nega HGB 11.3 07/19/2020    HCT 34.1 07/19/2020    PLT 98.0 07/19/2020    CREATSERUM 1.77 07/19/2020    BUN 28 07/19/2020     07/19/2020    K 4.2 07/19/2020     07/19/2020    CO2 26.0 07/19/2020    GLU 84 07/19/2020    CA 7.8 07/19/2020    ALB 2.1 0 were described by [facility].     Dictated by (CST): Jesus Forrest MD on 7/18/2020 at 8:13 AM     Finalized by (CST): Jesus Forrest MD on 7/18/2020 at 8:15 AM          Ct Abdomen Pelvis Iv Contrast, No Oral (er)    Result Date: 7/1 injection 4 mg, 4 mg, Intravenous, Q6H PRN  •  Piperacillin Sod-Tazobactam So (ZOSYN) 3.375 g in dextrose 5 % 100 mL ADD-vantage, 3.375 g, Intravenous, Q8H BECKY  •  Metoclopramide HCl (REGLAN) injection 5 mg, 5 mg, Intravenous, Q8H PRN  •  morphINE sulfate

## 2020-07-19 NOTE — PLAN OF CARE
Pt dines pain, sob, chills, fever, n/v. VS monitored. Medication reviewed. Safety maintained. Call light in reach. We will continue to monitor.   Problem: Patient/Family Goals  Goal: Patient/Family Long Term Goal  Description  Patient's Long Term Goal: to g pain  - Anticipate increased pain with activity and pre-medicate as appropriate  Outcome: Progressing     Problem: RISK FOR INFECTION - ADULT  Goal: Absence of fever/infection during anticipated neutropenic period  Description  INTERVENTIONS  - Monitor WBC output and hemodynamic stability  Description  INTERVENTIONS:  - Monitor vital signs, rhythm, and trends  - Monitor for bleeding, hypotension and signs of decreased cardiac output  - Evaluate effectiveness of vasoactive medications to optimize hemodynamic ordered  - Evaluate effectiveness of ordered antiemetic medications  - Provide nonpharmacologic comfort measures as appropriate  - Advance diet as tolerated, if ordered  - Obtain nutritional consult as needed  - Evaluate fluid balance  Outcome: Progressing algorithm/standards of care as needed  Outcome: Progressing

## 2020-07-19 NOTE — PROGRESS NOTES
[facility]    Progress Note    Evelina Bella Patient Status:  Inpatient    1926 MRN [id number]   Location [facility] 5SW/SE Attending Vivienne Mendoza MD   Hosp Day # 1 PCP Shy Bee MD        Subjective:     Yasemin    With mild dilatation of Common bile duct measures 7 mm          Hydronephrosis with urinary obstruction due to renal calculus- as above           Chronic systolic congestive heart failure ([address]- no sob , lungs clear , will monitor, will hold diuretics 3:39 PM          Xr Chest Ap Portable  (cpt=71045)    Result Date: 7/18/2020  CONCLUSION:  1. No acute findings. 2. Findings were described by [facility].     Dictated by (CST): Ever Forrest MD on 7/18/2020 at 8:13 AM     Finalized by (CST)

## 2020-07-19 NOTE — CONSULTS
[facility]    PATIENT'S NAME: Bernie Walton PHYSICIAN: Hecotr Navarrete MD   CONSULTING PHYSICIAN: Angy Torres.  Brandan Santoyo MD   PATIENT ACCOUNT#:   [id number]    LOCATION:  [address] [place] [address] #:   [id number]       DATE OF BIRTH:  [birth year]. Occasional caffeine. FAMILY HISTORY:  Negative for premature coronary disease. REVIEW OF SYSTEMS:  A 10-point review of systems otherwise negative.       PHYSICAL EXAMINATION:    GENERAL:  Well-developed, well-nourished male in no acute distr EKG.  There should be no issues regarding his defibrillator. Thank you for this consultation. We will continue to follow the patient after his procedure tomorrow. Dictated By Lesli Nevarez.  Diony Augustin MD  d: 07/19/2020 14:23:59  t: 07/19/2020 16:05:49  [address]

## 2020-07-19 NOTE — PROGRESS NOTES
[facility] Note:  Renal Adjustment for piperacillin/tazobactam (Michelle Odor)    Marques Bone is a 80year old patient who has been prescribed piperacillin/tazobactam (ZOSYN) 3.375 g every 8 hrs.   CrCl is estimated creatinine clearance is 21.4 mL/min (A) (based

## 2020-07-19 NOTE — CONSULTS
Cardiology (consult dictated). Assessment:  1. Coronary artery disease, status post remote myocardial infarction and PCI. 2.  Ischemic cardiomyopathy with severe LV dysfunction. Compensated. 3.  Biventricular ICD in place.     4.  Preop assessmen

## 2020-07-19 NOTE — CONSULTS
[facility]    Report of Consultation    Luis Manuel Cramer Patient Status:  Inpatient    1926 MRN [id number]   Location [facility] 5SW/SE Attending Vidal Logan MD   Hosp Day # 1 PCP Jayy Cabrera MD     Date of Admission:   11/19/2018   • Heart disease    • History of measles    • History of mumps    • Subjective tinnitus 2/9/2013   • Urinary tract infection with hematuria 12/6/2018       Past Surgical History  Past Surgical History:   Procedure Laterality Date   • ANGIOPLAST Tab, Take 1 tablet (200 mg total) by mouth daily. atorvastatin 20 MG Oral Tab,   carvedilol (COREG) 6.25 MG Oral Tab, Take 6.25 mg by mouth 2 (two) times daily with meals.     Ondansetron HCl (ZOFRAN) 4 mg tablet, Take 1 tablet (4 mg total) by mouth every Colby Marsh MD on 7/18/2020 at 7:55 AM     Finalized by (CST): Colby Marsh MD on 7/18/2020 at 7:58 AM          Us Liver (cpt=76705)    Result Date: 7/18/2020  CONCLUSION:  1. Calcification in the right lobe of the liver.   The liver otherwis daughter Nohemi Arora at the bedside. He has a 7 x 4 mm obstructing stone on the right side. Chance of passing a stone spontaneously if about 30 to 40% discussed with the patient and his daughter.   Options for management including an attempt at spontaneous p

## 2020-07-19 NOTE — CONSULTS
[facility]   Gastroenterology Consultation Note    Bonnie Hamilton  Patient Status:    Inpatient  Date of Admission:         7/17/2020, Hospital day #1  Attending:   Aliya Painter MD  PCP:     Candelario Cuellar MD    Reason for [facility] History:   Diagnosis Date   • B12 deficiency 2012   • Closed fracture of multiple ribs of right side with routine healing 2/11/2019   • Disorder of coccyx 5/20/2013   • Fracture of arm 1979   • Hearing loss    • Heart attack ([facility] [address] 1984   • Heart attack ( negative for severe shortness of breath  CARDIOVASCULAR:  negative for crushing sub-sternal chest pain  GASTROINTESTINAL:  see HPI  GENITOURINARY:  negative for dysuria or gross hematuria  INTEGUMENT/BREAST:  SKIN:  negative for jaundice   ALLERGIC/IMMUNOL No free fluid hepatorenal fossa. No hydronephrosis right kidney.     Dictated by (CST): Gabriele Messina MD on 7/18/2020 at 7:55 AM     Finalized by (CST): Gabriele Messina MD on 7/18/2020 at 7:58 AM           Liver (cpt=76705)    Result Date: 7/18/ 9:52 AM            Assessment & Plan   Katherine Ceballos is a a(n) 80year old male w/ a history of ischemic cardiomyopathy, EF of 20-25%.  CKD 3, B12 deficiency, history of heart attack history of urinary tract infection, who presents with right-sided flank venu

## 2020-07-20 PROBLEM — R11.2 NAUSEA AND VOMITING: Status: ACTIVE | Noted: 2020-01-01

## 2020-07-20 NOTE — TELEPHONE ENCOUNTER
Staff this patient needs to come in on Thursday this week for Stack catheter removal.  He needs a BMP and a KUB that same day to reassess kidney function and to see if the stone on the right. I placed both those orders in the system.   He is likely being d

## 2020-07-20 NOTE — PLAN OF CARE
Problem: Patient/Family Goals  Goal: Patient/Family Long Term Goal  Description  Patient's Long Term Goal: to go home    Interventions:  - monitor vss  - assess pain  - monitor labs  -assess  - See additional Care Plan goals for specific interventions Absence of fever/infection during anticipated neutropenic period  Description  INTERVENTIONS  - Monitor WBC  - Administer growth factors as ordered  - Implement neutropenic guidelines  Outcome: Progressing     Problem: SAFETY ADULT - FALL  Goal: Free from signs of decreased cardiac output  - Evaluate effectiveness of vasoactive medications to optimize hemodynamic stability  - Monitor arterial and/or venous puncture sites for bleeding and/or hematoma  - Assess quality of pulses, skin color and temperature  - tolerated, if ordered  - Obtain nutritional consult as needed  - Evaluate fluid balance  Outcome: Progressing  Goal: Maintains or returns to baseline bowel function  Description  INTERVENTIONS:  - Assess bowel function  - Maintain adequate hydration with I

## 2020-07-20 NOTE — ANESTHESIA PROCEDURE NOTES
Airway  Date/Time: 7/20/2020 12:02 PM  Urgency: Elective    Airway not difficult    General Information and Staff    Patient location during procedure: OR  Anesthesiologist: Dane Campos MD  Performed: anesthesiologist     Indications and Patient Haughton

## 2020-07-20 NOTE — OPERATIVE REPORT
[facility]    Operative Note     Carmen Ping Location: OR   CSN [id number] MRN [id number]   Admission Date 7/17/2020 Operation Date 7/20/2020   Attending Physician Sheeba Guzman MD Operating Physician Ariel Clinton MD      Preoperative There is copious amounts of calcified debris and what appears to be a couple small stones very close to the right ureteral orifice. A picture was submitted into the chart. I was unable to cannulate the ureteral orifice using a 6 [place] open-ended.   A co collecting system that will need to pass prior to obtaining an adequate KUB. I will communicate these findings with the patient's daughters.           Alvaro Leavitt MD  7/20/2020  12:57 PM

## 2020-07-20 NOTE — PROGRESS NOTES
[facility] - [facility]    Progress Note    Omkar Asencio Patient Status:  Inpatient    1926 MRN [id number]   Location [address] UNIT Attending Miguel Mac MD   Hosp Day # 2 PCP MD Ramesh Kirby collecting system from CT examination performed 12:27 am approximately 17 hours prior. 2. Moderate stool throughout the colon consistent constipation/fecal retention.     Dictated by (CST): Gabriele Messina MD on 7/19/2020 at 8:17 PM     Finalized by (CS

## 2020-07-20 NOTE — PLAN OF CARE
Pt denies pain, sob, chills, fever. Pt is NPO. Pt is going for cytoscopy today. Medication reviewed. Safety maintained. Call light in reach. We will continue to monitor.    Problem: Patient/Family Goals  Goal: Patient/Family Long Term Goal  Description  Shayla Perez Stop Aspirin 1 week prior to surgery  Hold Eliquis 3 days prior to surgery  Stop Losartan and start Amlodipine 5mg tab, 1 tab daily for blood pressure  Repeat nonfasting BMP lab in 2-3 weeks  See me in 4 weeks for follow-up blood pressure  Take Metoprolol the  AM of surgery. Hold other meds the AM of surgery  No solid food after midnight prior to surgery. May have clear liquids up to 8 AM the morning of surgery (5 hrs prior to surgery)  Resume Aspirin and Eliquis post op as previous   patient reports new pain  - Anticipate increased pain with activity and pre-medicate as appropriate  Outcome: Progressing     Problem: RISK FOR INFECTION - ADULT  Goal: Absence of fever/infection during anticipated neutropenic period  Description  INTERVEN Maintains optimal cardiac output and hemodynamic stability  Description  INTERVENTIONS:  - Monitor vital signs, rhythm, and trends  - Monitor for bleeding, hypotension and signs of decreased cardiac output  - Evaluate effectiveness of vasoactive medication low intermittent suction as ordered  - Evaluate effectiveness of ordered antiemetic medications  - Provide nonpharmacologic comfort measures as appropriate  - Advance diet as tolerated, if ordered  - Obtain nutritional consult as needed  - Evaluate fluid b interventions, skin care algorithm/standards of care as needed  Outcome: Progressing

## 2020-07-20 NOTE — PROGRESS NOTES
[facility]  Nephrology Daily Progress Note    Rip Aguilar  [id number]  80year old      HPI:   Rip Aguilar is a 80year old male. Cystoscopy report noted. Status post placement of a right ureteral stent. Currently states he feels well. for age reflexes and motor skills appropriate for age    Labs:  Lab Results   Component Value Date    WBC 7.0 07/20/2020    HGB 12.3 07/20/2020    HCT 37.3 07/20/2020    .0 07/20/2020    CREATSERUM 1.90 07/20/2020    BUN 29 07/20/2020     07/2 Finalized by (CST): Filippo Forrest MD on 7/18/2020 at 3:39 PM          Xr Chest Ap Portable  (cpt=71045)    Result Date: 7/18/2020  CONCLUSION:  1. No acute findings. 2. Findings were described by [facility].     Dictated by (CST): Noah on 7/18/2020 at 9:37 AM     Finalized by (CST): Bina Louis MD on 7/18/2020 at 9:52 AM          Xr Or - N/c    Result Date: 7/20/2020  CONCLUSION: Intraoperative exam. Please see operative report for further details.     Dictated by (CST): Juvencio Melendez sludge     Hydronephrosis with urinary obstruction due to renal calculus     Chronic systolic congestive heart failure (HCC)     Cirrhosis of liver with ascites (HCC)     Elevated LFTs     Nausea and vomiting      Urine output was marginal if accurate.   No

## 2020-07-20 NOTE — ANESTHESIA POSTPROCEDURE EVALUATION
Patient: Evelina Bella    Procedure Summary     Date:  07/20/20 Room / Location:  [facility] OR  / [facility] OR    Anesthesia Start:  0728 Anesthesia Stop:  4414    Procedure:  CYSTOSCOPY URETEROSCOPY (Right ) Diagnosis:       Right ureteral calculus      (R

## 2020-07-20 NOTE — PLAN OF CARE
Problem: Patient/Family Goals  Goal: Patient/Family Long Term Goal  Description  Patient's Long Term Goal: to go home    Interventions:  - monitor vss  - assess pain  - monitor labs  -assess  - See additional Care Plan goals for specific interventions Absence of fever/infection during anticipated neutropenic period  Description  INTERVENTIONS  - Monitor WBC  - Administer growth factors as ordered  - Implement neutropenic guidelines  Outcome: Progressing     Problem: SAFETY ADULT - FALL  Goal: Free from signs of decreased cardiac output  - Evaluate effectiveness of vasoactive medications to optimize hemodynamic stability  - Monitor arterial and/or venous puncture sites for bleeding and/or hematoma  - Assess quality of pulses, skin color and temperature  - perform bladder scan as needed  - Follow urinary retention protocol/standard of care  - Consider collaborating with pharmacy to review patient's medication profile  - Implement strategies to promote bladder emptying  Outcome: Progressing     Problem: METAB

## 2020-07-20 NOTE — PROGRESS NOTES
[facility]     Gastroenterology Progress Note    Niles Juarez Patient Status:  Inpatient    1926 MRN [id number]   Location [facility] 5SW/SE Attending Max Flowers MD   Hosp Day # 2 PCP MD Chaim Gómez HCC: no obvious lesions, AFP 4.1  - PSE: no obvious encephalopathy  - liver calcifications can be monitored outpatient    #Normocytic Anemia: with a component of iron deficiency.  Likely multifactorial d/t liver disease, chronic kidney disease and anemia of 96 (H) 07/20/2020    INR 1.31 (H) 07/19/2020    T4F 1.8 (H) 06/15/2020    TSH 1.120 07/18/2020     07/17/2020    MG 2.0 07/20/2020    PHOS 3.5 07/20/2020    TROP 0.03 03/18/2018    B12 1,207 (H) 07/20/2020       Us Liver (cpt=76705)    Result Date:

## 2020-07-20 NOTE — PROGRESS NOTES
[facility]    Progress Note    Destiney Handy Patient Status:  Inpatient    1926 MRN [id number]   Location [facility] 5SW/SE Attending Corine Hendricks MD   Hosp Day # 2 PCP Brooks Tapia MD        Subjective:   Subjective: functions and hemoglobin  Hopefully home in the morning    I have discussed with the daughter immediately after rounds through phone and again  at around 6:30 PM  after the procedure.             Results:     Lab Results   Component Value Date    WBC 7.0 07 seen Electronically signed on 07/19/2020 at 17:07 by MD Dex Reed MD  7/20/2020

## 2020-07-20 NOTE — ANESTHESIA PREPROCEDURE EVALUATION
Anesthesia PreOp Note    HPI:     Gabby Jacobs is a 80year old male who presents for preoperative consultation requested by:  Theodosia Bumpers, MD    Date of Surgery: 7/17/2020 - 7/20/2020    Procedure(s):  CYSTOSCOPY URETEROSCOPY  Indication: Right ureteral of multiple ribs of right side with routine healing 2/11/2019   • Disorder of coccyx 5/20/2013   • Fracture of arm 1979   • Hearing loss    • Heart attack ([facility] [address] 1984   • [facility] 11/19/2018   • Heart disease    • History of measles    • History o Virgen Stone MD  Piperacillin Sod-Tazobactam So (ZOSYN) 3.375 g in dextrose 5 % 100 mL ADD-vantage, 3.375 g, Intravenous, Q12H, Hector Smallwood MD, Last Rate: 25 mL/hr at 07/20/20 0550, 3.375 g at 07/20/20 0550  amiodarone HCl (PACERONE) tab 200 mg, 200 mg, Ora Not on file        Inability: Not on file      Transportation needs:        Medical: Not on file        Non-medical: Not on file    Tobacco Use      Smoking status: Never Smoker      Smokeless tobacco: Never Used    Substance and Sexual Activity      Alcoh 07/20/2020    K 4.2 07/20/2020     07/20/2020    CO2 25.0 07/20/2020    BUN 29 (H) 07/20/2020    CREATSERUM 1.90 (H) 07/20/2020     (H) 07/20/2020    CA 8.6 07/20/2020     Lab Results   Component Value Date    INR 1.31 (H) 07/19/2020       Vit daughter. I have informed Jorge Mariner and/or legal guardian or family member of the nature of the anesthetic plan, benefits, risks including possible dental damage if relevant, major complications, and any alternative forms of anesthetic management.

## 2020-07-20 NOTE — PROGRESS NOTES
Spoke with representative at [address] [place] [address] for pacemaker per pre-op request. Representative will reach out to [facility] for further information.      [facility] [phone]

## 2020-07-21 NOTE — TELEPHONE ENCOUNTER
Noted, thank you.   Future Appointments   Date Time Provider Sawyer Rosenberg   8/10/2020  9:30 AM ADRIÁN Villalba [facility] MONIKA GUAJARDO

## 2020-07-21 NOTE — PROGRESS NOTES
[facility]     Gastroenterology Progress Note    Trang Mcginnis Patient Status:  Inpatient    1926 MRN [id number]   Location [facility] 5SW/SE Attending Keanu White MD   Hosp Day # 3 PCP MD [place] are slightly up today however alk phos has improved with normal total bilirubin.   - Ascites: mild - would not recommend paracentesis  - Varices: consideration for upper endoscopy for evaluation however high risk for endoscopy d/t EF  - HCC: no obvious lesi  (H) 07/21/2020    CO2 22.0 07/21/2020     (H) 07/21/2020    CA 7.8 (L) 07/21/2020    ALB 1.9 (L) 07/21/2020    ALKPHO 126 (H) 07/20/2020    BILT 0.8 07/20/2020    TP 6.1 (L) 07/20/2020    AST 83 (H) 07/20/2020    ALT 96 (H) 07/20/2020    I

## 2020-07-21 NOTE — PLAN OF CARE
Pt denies pain, sob, chills, fever. Pt has pineda in place that drains bloody urine. Pt will be discontinue from tele monitor approved by Dr Nick Brown due to not enough tele box in the hospital needed it for other patients. Pt is stable. VS monitored.  Safety Manage/alleviate anxiety  - Utilize distraction and/or relaxation techniques  - Monitor for opioid side effects  - Notify MD/LIP if interventions unsuccessful or patient reports new pain  - Anticipate increased pain with activity and pre-medicate as approp or complex needs related to functional status, cognitive ability or social support system  Outcome: Progressing     Problem: CARDIOVASCULAR - ADULT  Goal: Maintains optimal cardiac output and hemodynamic stability  Description  INTERVENTIONS:  - Monitor vi absence of nausea and vomiting  Description  INTERVENTIONS:  - Maintain adequate hydration with IV or PO as ordered and tolerated  - Nasogastric tube to low intermittent suction as ordered  - Evaluate effectiveness of ordered antiemetic medications  - Prov factors for pressure ulcer development  - Assess and document skin integrity  - Monitor for areas of redness and/or skin breakdown  - Initiate interventions, skin care algorithm/standards of care as needed  Outcome: Progressing

## 2020-07-21 NOTE — TELEPHONE ENCOUNTER
PHONE ROOM. Left message on miranda Talamantes cell [phone] (have CALLI) to call to make consult post emh appt with Vasyl GUEVARA or Dr Edd Terry in 2-4 weeks as per below. Please send back to GI RNs when appt made. Thanks.     I also attempted the cell [phone]

## 2020-07-21 NOTE — HOME CARE LIAISON
Received referral from [facility] RN [address]. Per request spoke with daughter Luli Lyn, confirmed agreeable to [address] [place] [address] , pending orders. Residential brochure provided with contact information. All questions addressed and answered.

## 2020-07-21 NOTE — DISCHARGE SUMMARY
[facility]    Discharge Summary    iLbrado Lucas Patient Status:  Inpatient    1926 MRN [id number]   Location [facility] 5SW/SE Attending Pili Baptiste MD   Hosp Day # 3 PCP Mariela De Leon MD     Date of Admission: 20 to have mild hematuria. Urology has been cleared him for discharge with Stack catheter. He was started on antibiotics for UTI as well. I have discussed with patient's daughter regarding this.     Discharge Physical Exam:  Vital Signs:  Blood pressure 112 None    Consultants     Provider Role Specialty    German Rowe MD Consulting Physician  NEPHROLOGY     Myah Louis MD Consulting Physician  SURGERY, GENERAL    Marlene Odell 132 Physician  UROLOGY        Surgical Procedures     Case IDs Abdominal xray on Wednesday afternoon. [address] location lab is open until 4 pm.    Follow up with Dr. Ortiz Crew on Thursday at 9:20 am for pineda catheter removal    Amoxicillin 500 mg by mouth twice a day for 5 days.     URINARY CATHETER / PINEDA CARE PATIENT Medium Risk  0-28   Low Risk. TCM Follow-Up Recommendation:  LACE > 58: High Risk of readmission after discharge from the hospital.    Time spent:  30 to 74 minutes, more than 50% of the time was spend on counseling and coordination of care.     You Armenta

## 2020-07-21 NOTE — PROGRESS NOTES
[facility]  Progress Note    Chaya Lomas Patient Status:  Inpatient    1926 MRN [id number]   Location [facility] 5SW/SE Attending Ewing Kehr, MD   Hosp Day # 3 PCP MD Fidelia Quinteros please place new stat lock.   Patient to go home with pineda catheter, provide with catheter care and leg bag teaching.  -Patient likely having bladder spasms however given his age and co-morbidities would refrain from anticholinergics or tamsulosin due to p MD on 7/19/2020 at 8:17 PM     Finalized by (CST): Daniel Lloyd MD on 7/19/2020 at 8:25 PM          Xr Or - N/c    Result Date: 7/20/2020  CONCLUSION: Intraoperative exam. Please see operative report for further details.     Dictated by (CST): Amber

## 2020-07-21 NOTE — PROGRESS NOTES
Patient dc home. IV removed. Understands to follow up with PCP in 1 week, urology in a few days. DC instructions went over with daughter at bedside- per dtr aware how to care for pineda. SHYAM Danielson will go over pineda care again with pt and dtr.  [facility] ordered f

## 2020-07-21 NOTE — PROGRESS NOTES
[facility]  Nephrology Daily Progress Note    Salma Fong  [id number]  80year old      HPI:   Salma Fong is a 80year old male. Feels well and anxious to go home. Eating well. No pain.        ROS:     Constitutional:  Negative for dec 07/21/2020    CREATSERUM 1.68 07/21/2020    BUN 31 07/21/2020     07/21/2020    K 4.1 07/21/2020     07/21/2020    CO2 22.0 07/21/2020     07/21/2020    CA 7.8 07/21/2020    ALB 1.9 07/21/2020    ALB 1.9 07/21/2020    ALKPHO 100 07/21/20 operative report for further details.     Dictated by (CST): Tere Celestin MD on 7/20/2020 at 1:57 PM     Finalized by (CST): Tere Celestin MD on 7/20/2020 at 1:58 PM              Medications:    Current Facility-Administered Medications:   •  Piperacillin So LFTs     Nausea and vomiting      UO adequate and renal function better to 1.68.  BL creat about 1.3  PO intake good. D/C IVF. Ambulate and OK from renal stand point for D/C home. Drink water.   For now would continue to hold Lasix and lisinopril until re

## 2020-07-21 NOTE — TELEPHONE ENCOUNTER
Inpatient Orders:  - Initial GI consult: Dr. Geovanna Zhang  - Hospitalization Dx: Elevated LFTs, newly diagnosed cirrhosis  - recommend f/u in 2-4 weeks outpatient with Dr. Geovanna Zhang or [facility]; patient also with 6 months of intermittent nausea/vomiting vs regurg

## 2020-07-22 NOTE — TELEPHONE ENCOUNTER
pts daughter states pt is complaining of pain due to stent, requesting to speak to RN, call was transferred to RN.

## 2020-07-22 NOTE — TELEPHONE ENCOUNTER
Daughter, Willi Charlton states patient had a renal stent placed 2 days ago. Patient is now complaining of right sided back pain, where the stent was placed. Patient scheduled to have pineda catheter removed tomorrow.    Advised daughter to contact 's of

## 2020-07-22 NOTE — TELEPHONE ENCOUNTER
S/W pt's dtr and informed that unfortunately pt's pain and discomfort is stent related not to mention the pineda cath discomfort.  I explained that the stent can cause pain in the affected kidney, urinary frequency and urgency dysuria and intermittent hematu

## 2020-07-23 ENCOUNTER — ANCILLARY PROCEDURE (OUTPATIENT)
Dept: CARDIOLOGY | Age: 85
End: 2020-07-23
Attending: INTERNAL MEDICINE

## 2020-07-23 ENCOUNTER — ANCILLARY ORDERS (OUTPATIENT)
Dept: CARDIOLOGY | Age: 85
End: 2020-07-23

## 2020-07-23 DIAGNOSIS — Z45.02 ENCOUNTER FOR ASSESSMENT OF IMPLANTABLE CARDIOVERTER-DEFIBRILLATOR (ICD): ICD-10-CM

## 2020-07-23 PROCEDURE — X1114 CARDIAC DEVICE HOME CHECK - REMOTE UNSCHEDULED: HCPCS | Performed by: INTERNAL MEDICINE

## 2020-07-23 NOTE — PROGRESS NOTES
Mansoor Merrill is a 80year old male. HPI:   Patient presents with:   Follow - Up: patient presents for f/u on bmp and kub results and also to have catheter removed       80year old male here for followup to a 6 mm right mid obstructing ureteral stone  He • Diabetes Mother    • Breast Cancer Daughter       Social History: Social History    Tobacco Use      Smoking status: Never Smoker      Smokeless tobacco: Never Used    Alcohol use:  Yes      Alcohol/week: 2.0 standard drinks      Types: 2 Glasses of win

## 2020-07-23 NOTE — TELEPHONE ENCOUNTER
"Subjective:       Patient ID: Génesis Urrutia 

## 2020-07-24 PROCEDURE — 93295 DEV INTERROG REMOTE 1/2/MLT: CPT | Performed by: INTERNAL MEDICINE

## 2020-07-24 NOTE — TELEPHONE ENCOUNTER
Patient has a normal TSH level ( the hormone that controls thyroid hormoe production and is the mpst sensitive marker of thyroid function)  His T4 ( the hormone level) itself is still high  It was 1.8, it is 2 now  Since TSH is normal, if he ruiz snot have

## 2020-07-27 PROBLEM — F03.90 DEMENTIA WITHOUT BEHAVIORAL DISTURBANCE (HCC): Status: ACTIVE | Noted: 2020-01-01

## 2020-07-27 NOTE — PROGRESS NOTES
Marcus Perez is a 80year old male. Patient presents with:  Hospital F/U: kidney stone removal  Dementia: seems to be getting worse    HPI:   80-year-old gentleman with a past medical history of heart failure, hypertension, GERD here for follow-up after hos 2000   • CARDIAC DEFIBRILLATOR PLACEMENT     • CYSTOSCOPY URETEROSCOPY Right 7/20/2020    Performed by Clif Brown MD at [address] [place] [address] OR   • PACEMAKER/DEFIBRILLATOR  11/2018   • PT W/ CORONARY ARTERY STENT  2000      Social History:  Social History    [facility] normal.   Neurological: He is alert and oriented to person, place, and time. No cranial nerve deficit, sensory deficit or motor deficit. Psychiatric: His behavior is normal.         ASSESSMENT AND PLAN:   1.  Essential hypertension  Blood pressure is slig

## 2020-07-27 NOTE — PATIENT INSTRUCTIONS
We will resume the Lasix-furosemide once a day. I have given you a referral to see neurologist.  Please call and make the appointment. Please continue to hold lisinopril for now.   Make sure you follow-up with urology for the stent removal as advised by bowen

## 2020-08-04 NOTE — TELEPHONE ENCOUNTER
[address] asking if okay to substitute knee-high compression stockings for the order for thigh-high.

## 2020-08-04 NOTE — PROGRESS NOTES
Dinora Munoz is a 80year old male. Patient presents with:  Edema: bilateral leg swelling    HPI:   80year-old gentleman with recent hospitalization for ureterolithiasis and acute renal failure came here for follow-up.   Overall he reports that he is doing Social History:  Social History    Tobacco Use      Smoking status: Never Smoker      Smokeless tobacco: Never Used    Alcohol use:  Yes      Alcohol/week: 2.0 standard drinks      Types: 2 Glasses of wine per week    Drug use: No     Family History injury) ([facility] [address]   -We will repeat BMP. Patient will follow up with nephrology as well. Will adjust Lasix and lisinopril depending upon creatinine.  - BASIC METABOLIC PANEL (8); Future  - NEPHROLOGY - INTERNAL  - BASIC METABOLIC PANEL (8)    2.  Essential hy

## 2020-08-10 ENCOUNTER — TELEPHONE (OUTPATIENT)
Dept: CARDIOLOGY | Age: 85
End: 2020-08-10

## 2020-08-10 NOTE — PATIENT INSTRUCTIONS
-continue protonix 40 mg once daily  -low sodium, low fat diet  -avoid alcohol  -labs today  -consider hep b vaccine  -multivitamin with iron (miralax for constipation)  -return to clinic in 6 mos for imaging repeat labs  -call if worsening abd pain, n/v,

## 2020-08-10 NOTE — TELEPHONE ENCOUNTER
Contacted pt's daughter Bert Smith regarding cardiac clearance prior to considering egd for variceal screening. At [address] [facility] [address] pt was with daughter Walter Cueto and did not wish to pursue EGD.   Bert Smith would like to pursue upper endoscopy and patient and family to discuss

## 2020-08-10 NOTE — TELEPHONE ENCOUNTER
Request for cardiac clearance faxed to Dr. Hernández/Cardiology at [phone], confirmation received 8/10/2020 @ [phone].    -Awaiting cardiac clearance at this time and pt family to decide if they will or will not schedule EGD with Dr. Helen Perez.

## 2020-08-13 NOTE — TELEPHONE ENCOUNTER
See TE from Dr. Dustin Jacques office under 8/10/2020 with cardiac clearance. CI APRN- OK to forward to GI Schedulers to schedule EGD for Dx: cirrhosis, variceal screening? If so, please advise on sedation, thank you.

## 2020-08-13 NOTE — TELEPHONE ENCOUNTER
Telephone Encounter - Elvis Amos RN - 08/11/2020 4:54 PM CDT  Phone conversation with Domingo Ragsdale.  Will fax note to Dr. Sharmin Sandoval as well at [phone]    Electronically signed by Gideon Paredes RN at 08/11/2020 4:55 PM CDT      Telephone Encounter - B

## 2020-08-17 PROBLEM — N17.9 AKI (ACUTE KIDNEY INJURY) (HCC): Status: RESOLVED | Noted: 2020-01-01 | Resolved: 2020-01-01

## 2020-08-17 NOTE — PROGRESS NOTES
Luis Manuel Cramer is a 80year old male. Patient presents with:  Edema: lower extremities follow-up  Follow-up of hypertension, kidney stones, swelling of the legs  HPI:   80year-old gentleman with a past medical history of cardiac arrhythmias, dilated cardiomy 2000   • CARDIAC DEFIBRILLATOR PLACEMENT     • CYSTOSCOPY URETEROSCOPY Right 7/20/2020    Performed by Eduardo Leija MD at [address] [place] [address] OR   • PACEMAKER/DEFIBRILLATOR  11/2018   • PT W/ CORONARY ARTERY STENT  2000      Social History:  Social History    Tob Neurological: He is alert and oriented to person, place, and time. Psychiatric: His behavior is normal.         ASSESSMENT AND PLAN:   1. Essential hypertension  Well-controlled. We will continue with the current medical regimen.     2. Chronic systoli

## 2020-08-18 NOTE — TELEPHONE ENCOUNTER
GI Schedulers-    Please schedule as per CI APRN orders below, thank you. Cardiac clearance has been provided.

## 2020-08-20 RX ORDER — AMIODARONE HYDROCHLORIDE 200 MG/1
200 TABLET ORAL DAILY
Qty: 90 TABLET | Refills: 1 | Status: SHIPPED | OUTPATIENT
Start: 2020-08-20

## 2020-08-25 NOTE — TELEPHONE ENCOUNTER
Attempted to contact Kristen Bermudez, patient daughter:    Khanh Szymanski. Third attempt. Letter sent via [employer] and mail.

## 2020-08-27 NOTE — TELEPHONE ENCOUNTER
Javy HOWE from [facility] asking for order to D/C patient from home health care. Please advise.     SHYAM Palafox [phone]

## 2020-08-29 NOTE — TELEPHONE ENCOUNTER
Action Requested: Summary for Provider     []  Critical Lab, Recommendations Needed  [] Need Additional Advice  [x]   FYI    []   Need Orders  [] Need Medications Sent to Pharmacy  []  Other     SUMMARY: Daughter will take patient to [facility] for swelling an

## 2020-08-29 NOTE — ED INITIAL ASSESSMENT (HPI)
PATIENT ARRIVED AMBULATORY TO ROOM. PATIENT C/O SWELLING TO THE LEFT HAND AND THE LEFT ELBOW. CARETAKER ALSO STATES \"HE VOMITED ONCE THIS MORNING. THAT HAPPENS PERIODICALLY.  HE ATE DINNER AT HIS DAUGHTERS LAST NIGHT AND WHEN SHE GIVES HIM PASTA ITS TOO AC

## 2020-08-31 NOTE — ED PROVIDER NOTES
Patient Seen in: [facility] AND [facility] Emergency Department      History   Patient presents with:  Upper Extremity Injury    Stated Complaint: right shoulder injury     HPI    80-year-old male presents the ER with complaint of right shoulder pain.   Patient s °C) (Oral)   Resp 16   Wt 83.5 kg   SpO2 97%   BMI 31.60 kg/m²         Physical Exam  Vitals signs and nursing note reviewed. Constitutional:       Appearance: Normal appearance. He is normal weight. HENT:      Head: Normocephalic and atraumatic. Impression:  Dislocation of right shoulder joint, initial encounter  (primary encounter diagnosis)    Disposition:  Discharge  8/30/2020 10:24 pm    Follow-up:  Rick Carrero MD  [address] [facility]  [phone]    Schedule

## 2020-08-31 NOTE — ED NOTES
Patient tolerated reduction well. Pt is alert and oriented at baseline. Following commands.  Daughter at bedside

## 2020-08-31 NOTE — ED NOTES
Patient is in stable condition. Provided discharge instructions and follow-up information with understanding verbalized. Agrees to seek medical attention for any new or worsening condition.

## 2020-08-31 NOTE — H&P
NURSING INTAKE COMMENTS: Patient presents with:  Shoulder Pain: fell at home, right shoulder dilocation, reduced in ER      HPI: This 80year old male presents today with complaints of right shoulder dislocation occurring yesterday.   Patient comes to offic daily. 90 tablet 1   • atorvastatin 20 MG Oral Tab   3   • carvedilol (COREG) 6.25 MG Oral Tab Take 6.25 mg by mouth 2 (two) times daily with meals.          No Known Allergies  Family History   Problem Relation Age of Onset   • Cancer Father         stomac and  strength. Sensation gross intact light touch throughout left upper extremity. Imaging: Xr Forearm (2 Views), Left (cpt=73090)    Result Date: 8/29/2020  PROCEDURE: XR FOREARM (2 VIEWS), LEFT (CPT=73090)  COMPARISON: None.   INDICATIONS: Swellin fracture deformities. Hypertrophic degeneration right AC joint. Right glenohumeral degeneration. SOFT TISSUES: Tortuous atherosclerotic aorta. Transvenous defibrillator leads partially imaged. EFFUSION: None visible. OTHER: Negative.          CONCLUSION: COMPRESSIBILITY: Normal.  OTHER: Negative. CONCLUSION: Normal examination.      Dictated by (CST): Martha Forrest MD on 8/29/2020 at 12:08 PM     Finalized by (CST): Martha Forrest MD on 8/29/2020 at 12:09 PM             Lab Resul

## 2020-09-01 NOTE — PROGRESS NOTES
Marques Bone is a 80year old male. HPI:   Patient presents with:  Procedure: PT presents for cystoscopy with stent removal.  Other: PT went to the ER a couple of days ago and should is disloated.        35-year-old male presents in follow-up to cystosco Social History: Social History    Tobacco Use      Smoking status: Never Smoker      Smokeless tobacco: Never Used    Alcohol use:  Yes      Alcohol/week: 2.0 standard drinks      Types: 2 Glasses of wine per week    Drug use: No       Medications (Ac orders of the defined types were placed in this encounter.       Meds This Visit:  Requested Prescriptions      No prescriptions requested or ordered in this encounter       Imaging & Referrals:  [facility] (SIC=[id number])     9/1/2020  Darleen Kanner, MD

## 2020-09-02 NOTE — TELEPHONE ENCOUNTER
Arabella Guevara called and rn spoke with her . Jeovany Ha her message by dr Kenya Villarreal.  verbalzied understanding and will take patient to repeat labs phone number provided for lab and also told can sched via [employer] ordered

## 2020-09-03 NOTE — TELEPHONE ENCOUNTER
Spoke with daughter Ale--reports patient was seen in ER after fall Sunday--right shoulder dislocation reduced. Patient then saw Dr. Jeb Espinal arm in sling with home PT.  Patient was also seen at [facility] Saturday for left elbow/arm swelling--now

## 2020-09-03 NOTE — TELEPHONE ENCOUNTER
Left message for daughter Rhodia Essex (CALLI verified) to keep appt tomorrow with Dr. Lauren Mak as scheduled--to return call for any questions or concerns.
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
## 2020-09-04 NOTE — TELEPHONE ENCOUNTER
Patient's daughter, Jose Bush, who states she's the POA of patient is requesting call back from Dr. Hope Lover. Daughter states she wants to discuss patient's condition and patient's visit today 09/04/2020 with Dr. Karine Gruber.    Call back # [phone]

## 2020-09-04 NOTE — TELEPHONE ENCOUNTER
Talked to sister   Issue with family dynamics  I have informed her about the plan for rehab    Reji Kelly

## 2020-09-04 NOTE — PROGRESS NOTES
Keshawn Mullins is a 80year old male. Patient presents with:  Fall: pt feeling weak, not eating,  hard to walk, disoriented, has been to urgent care, not feeling well    HPI:   80year-old gentleman with a past medical history of dilated cardiomyopathy, cardi Past Medical History:   Diagnosis Date   • JUAN (acute kidney injury) ([facility] [address] 7/18/2020   • B12 deficiency 2012   • Closed fracture of multiple ribs of right side with routine healing 2/11/2019   • Disorder of coccyx 5/20/2013   • Fracture of arm 1979   • H kg)  08/30/20 : 184 lb 1.4 oz (83.5 kg)  08/17/20 : 184 lb (83.5 kg)    Body mass index is 31.58 kg/m².       EXAM:   /66 (BP Location: Left arm, Patient Position: Sitting, Cuff Size: adult)   Pulse 60   Temp 97 °F (36.1 °C) (Temporal)   Ht 5' 4\" (1. quality of living hospice is also appropriate. I have informed both the daughters that I will not be involved with their family issues.   They need to sort out among themselves or get help with attorneys      The patient indicates understanding of these

## 2020-09-04 NOTE — TELEPHONE ENCOUNTER
Lilian Nice calling back to follow up. States she wants an update of what happened at patients visit. Is he being admitted?

## 2020-09-04 NOTE — TELEPHONE ENCOUNTER
I tried calling her. She did not pick it up. I do not understand why she is upset. Patient was accompanied by another daughter. They can get information from her as well.

## 2020-09-04 NOTE — TELEPHONE ENCOUNTER
Patient's daughter, Claudette Paez [facility]) called, very upset because she hasn't received a call back. She would like a call from the doctor, regarding patient's visit this morning.

## 2020-09-08 PROBLEM — I25.5 ISCHEMIC CARDIOMYOPATHY: Status: ACTIVE | Noted: 2019-03-12

## 2020-09-08 PROBLEM — R79.89 ELEVATED TROPONIN: Status: ACTIVE | Noted: 2020-01-01

## 2020-09-08 PROBLEM — R53.1 WEAKNESS GENERALIZED: Status: ACTIVE | Noted: 2020-01-01

## 2020-09-08 PROBLEM — R77.8 ELEVATED TROPONIN: Status: ACTIVE | Noted: 2020-01-01

## 2020-09-08 PROBLEM — N28.9 RENAL INSUFFICIENCY: Status: ACTIVE | Noted: 2020-01-01

## 2020-09-08 PROBLEM — E78.1 HYPERGLYCERIDEMIA, PURE: Status: ACTIVE | Noted: 2019-03-12

## 2020-09-08 PROBLEM — R60.1 ANASARCA: Status: ACTIVE | Noted: 2020-01-01

## 2020-09-08 PROBLEM — S22.49XA CLOSED FRACTURE OF MULTIPLE RIBS, UNSPECIFIED LATERALITY, INITIAL ENCOUNTER: Status: ACTIVE | Noted: 2020-01-01

## 2020-09-08 NOTE — TELEPHONE ENCOUNTER
Action Requested: Summary for Provider     []  Critical Lab, Recommendations Needed  [] Need Additional Advice  []   FYI    [x]   Need Orders  [] Need Medications Sent to Pharmacy  []  Other     SUMMARY: patient's daughter Leopoldo Hook calling, 'my father has ta

## 2020-09-08 NOTE — TELEPHONE ENCOUNTER
Yeyo Dominique Daughter who has power of  indicated that she will drop off the paperwork at the office so it is in the patient's chart. Yeyo Dominique indicates that her sister Kari Vasquez has patient in an apartment that Kari Vasquez and the patient are renting.  Patient's hea

## 2020-09-08 NOTE — TELEPHONE ENCOUNTER
Talked to [place] - reports that her dad mental status and performance status got worse over the weekend  He doesn't want to eat and wants to die peacefully   They are considering hospice   Patient is residing in the second floor and will be hard for her to

## 2020-09-08 NOTE — TELEPHONE ENCOUNTER
I think I need some guidance here. I am not sure whom should we talk to get some guidance    We need to involve ethics involved. As far as I know, patient's 2 daughters are taking care of her and the other daughter is the power of .   They have

## 2020-09-08 NOTE — TELEPHONE ENCOUNTER
Per our discussion, I would recommend if appropriate, that home health do an eval for hospice to ensure patient's comfort and health needs are being met.  Daughter Juana Arlene would have an opportunity then to express any concerns at time of hospice eval. Home he

## 2020-09-08 NOTE — TELEPHONE ENCOUNTER
Reviewed current situation surrounding pt with HH (refer to previous encounter). All HH orders are on hold for now correct?

## 2020-09-08 NOTE — TELEPHONE ENCOUNTER
Patient daughter walked into the [place] office with Power of Carmen Pantoja paper work that was scanned into patient chart. Patient daughter Winferd All requesting to speak to you.    # [phone]  Cell# [phone]

## 2020-09-08 NOTE — TELEPHONE ENCOUNTER
Nichole Bartholomew daughter indicated that she is the youngest daughter of patient. Cecy Cintron is the oldest daughter and Merced Eisenmenger is the middle daughter.  She indicated that all three daughters are on the power of , but it goes by eldest to youngest as far as rank

## 2020-09-08 NOTE — TELEPHONE ENCOUNTER
Called Roxana Gutierrez to ask her if she would be willing to sit down with her 2 sisters and have home health and a  evaluate the patient. Roxana Gutierrez reluctant to be in the same room as her sister's. Feels it is a setup and would not be a good idea.  Roxana mcleod

## 2020-09-08 NOTE — TELEPHONE ENCOUNTER
Javy from [place] [phone] calling to state pt discharge from home health was delayed due to fall.     Shiva Chamorro RN is requesting orders to see pt on 9/10/20 and 2/67/48 and then recert pt 5/72/51    Shiva Chamorro also asking for orders for     PT  [facility]

## 2020-09-09 PROBLEM — Z71.89 ADVANCE CARE PLANNING: Status: ACTIVE | Noted: 2020-01-01

## 2020-09-09 PROBLEM — Z71.89 GOALS OF CARE, COUNSELING/DISCUSSION: Status: ACTIVE | Noted: 2020-01-01

## 2020-09-09 PROCEDURE — 99222 1ST HOSP IP/OBS MODERATE 55: CPT | Performed by: INTERNAL MEDICINE

## 2020-09-09 NOTE — ED PROVIDER NOTES
Patient Seen in: [facility] AND [facility] Emergency Department    History   Patient presents with:  Fall  Weakness    Stated Complaint:     HPI    Patient presents today for a another fall.   He was seen here on August 30 had a right shoulder dislocation was red SILVER) Oral Chew Tab,  Chew 1 tablet by mouth daily. amiodarone HCl 200 MG Oral Tab,  Take 1 tablet (200 mg total) by mouth daily.    atorvastatin 20 MG Oral Tab,     carvedilol (COREG) 6.25 MG Oral Tab,  Take 6.25 mg by mouth 2 (two) times daily with me after the fall. I did reexamine him he did have some tenderness to left-sided chest we will do x-ray as well. Patient appears somewhat tired we will admit him to the hospital she was also concerning he has been laying on his back for a long.   She was con following components:    Troponin 0.077 (*)     All other components within normal limits   CBC W/ DIFFERENTIAL - Abnormal; Notable for the following components:    RBC 3.51 (*)     HGB 11.6 (*)     HCT 34.7 (*)     RDW-SD 61.3 (*)     RDW 17.3 (*)     All ([facility] I50.22 7/18/2020 Yes    Cirrhosis of liver with ascites ([facility] K74.60, R18.8 7/18/2020 Yes    Coronary atherosclerosis I25.10 5/5/2014 Yes    Dementia without behavioral disturbance ([facility] F03.90 7/27/2020 Yes    Dilated cardiomyopathy ([facility] I42.0 6/1/

## 2020-09-09 NOTE — CM/SW NOTE
01: 05PM  Received MDO for home evaluation and Hospice consult. SW received message from [facility], family is interested in Hospice meeting.  SW contacted Hospice liaison/Annie and initiated referral.    KISHAN also contacted pt's dtr, Marisol Valiente ([phone]) and [phone]

## 2020-09-09 NOTE — ED NOTES
Orders for admission, patient is aware of plan and ready to go upstairs. Any questions, please call ED SHYAM bills  at extension [phone].    Type of COVID test sent:9/8/20  COVID Suspicion level: Low

## 2020-09-09 NOTE — ED INITIAL ASSESSMENT (HPI)
Patient arrived via EMS from home for weakness, fall and poor urine output for the past 3-4 days. Per EMS-family reports patient appears jaundiced. Patient presents with bruising to right arm. C/o pain to left side.

## 2020-09-09 NOTE — CONSULTS
[id number] Patient Status:  Inpatient    1926 MRN [id number]   Location [facility] 3W/SW Attending Nelson Abbott MD   Hosp Day # 1 PCP Jolene Fowler MD     Date of Consult Substance Use/Abuse: occasional ETOH; no illicit drugs    Allergies:  No Known Allergies    Subjective/Objective: Patient seen in bed with his youngest daughter Rhodia Essex at the bedside. Tana Sevilla is easily arousable to verbal command. He is very hard of hearing. discussion/Advance Care Planning counseling and discussion:  See below.      Past Medical History:   Diagnosis Date   • JUAN (acute kidney injury) ([facility] 7/18/2020   • B12 deficiency 2012   • Closed fracture of multiple ribs of right side with routine healing ondansetron HCl (ZOFRAN) injection 4 mg, 4 mg, Intravenous, Q6H PRN  •  Metoclopramide HCl (REGLAN) injection 5 mg, 5 mg, Intravenous, Q8H PRN  •  lidocaine-menthol 4-1 % 1 patch, 1 patch, Transdermal, Daily  •  CefTRIAXone Sodium (ROCEPHIN) 1 g in sodium Objective:  Vital Signs:  Blood pressure 133/51, pulse 59, temperature 97.8 °F (36.6 °C), temperature source Axillary, resp. rate 18, height 5' 7\" (1.702 m), weight 191 lb 8 oz (86.9 kg), SpO2 93 %. Body mass index is 29.99 kg/m².   Present Level of recommended to both sisters that we focus on a discharge plan that is likely going to provide the best quality of life for the patient.      I discussed with each separately the possible discharge scenarios - discharge to a SNF for a ZEHRA stay (if recommende fracture of multiple ribs, unspecified laterality, initial encounter      Elevated troponin        Goals of care counseling/discussion  -Order for DNAR/Selective Treatment  -Continue supportive medical treatments  -Daughter/#1 Mauro Hatchet would like to [facility]

## 2020-09-09 NOTE — CM/SW NOTE
Hospice Order received, MSW spoke to Cesar Gastelum [phone] to schedule informational meeting. Jayla Irizarry agreeable to Friday at 230 and plans to have her sisters either present or via speaker phone during meeting. Hospital SW and RN updated.

## 2020-09-09 NOTE — OCCUPATIONAL THERAPY NOTE
RN requesting hold- patient's family with potential GOC discussion pending; patient currently resting; will put on follow up for 9/10

## 2020-09-09 NOTE — PHYSICAL THERAPY NOTE
PT evaluation orders received. Spoke to [facility]. Patient soundly sleeping. Plan for goals of care discussion with family on 9/10. [id number] pending results of family/pt discussion.      Thank you,  Kinsey Grullon, PT, DPT

## 2020-09-09 NOTE — CONSULTS
[facility]    Cardiology Consultation  [facility]    Tyson Rao Patient Status:  Inpatient    1926 MRN [id number]   Location [facility] 3W/SW Attending Spencer Ortega MD   Hosp Day # 1 PCP Zuleyka Barraza • CYSTOSCOPY URETEROSCOPY Right 7/20/2020    Performed by Miranda Vega MD at [address] [facility] MAIN OR   • PACEMAKER/DEFIBRILLATOR  11/2018   • PT W/ CORONARY ARTERY STENT  2000       Family History  See below, no early coronary disease  Family History   Problem MG) BY MOUTH EVERY MORNING BEFORE BREAKFAST  furosemide 20 MG Oral Tab, Take 20 mg by mouth daily. aspirin 81 MG Oral Tab, Take 81 mg by mouth. Multiple Vitamins-Minerals (MULTIVITAL PLATINUM SILVER) Oral Chew Tab, Chew 1 tablet by mouth daily.   amiodaro Total Intake -- 414.6 --       Output    Urine  --  400  --    Urine -- 400 --    Total Output -- 400 --       Net I/O     -- 14.6 --          Vent Settings:      Hemodynamic parameters (last 24 hours):      Scheduled Meds:   • amiodarone HCl  200 mg Or Recent Labs   Lab 09/08/20 1957 09/09/20  0507   * 102*   BUN 50* 49*   CREATSERUM 2.45* 2.18*   GFRAA 25* 29*   GFRNAA 22* 25*   CA 8.6 7.8*    144   K 4.2 3.9    110   CO2 27.0 23.0     Recent Labs   Lab 09/08/20 1957 09/09/2 think there is a cardiac event here. Spoke with daughter Lul Garcia as power of  and she would like the father to be made hospice.   Discussed this with Dr. Darius Das.  No other active cardiac changes at this time    Thank you for allowing me to participa

## 2020-09-09 NOTE — PLAN OF CARE
Patient lying in bed resting. Large bruise on right upper side with limited movement of shoulder. Multiple rib fractures from fall. C/o pain with Activities. Declined medication. Troponin elevated x 3. Provider aware.  Will endorse to the oncoming nursing s weights  Outcome: Progressing  Goal: Absence of cardiac arrhythmias or at baseline  Description  INTERVENTIONS:  - Continuous cardiac monitoring, monitor vital signs, obtain 12 lead EKG if indicated  - Evaluate effectiveness of antiarrhythmic and heart rat

## 2020-09-09 NOTE — ED NOTES
Daughter states patient has had 2 falls in the past week. He had a kidney stone removed approximately 1 month ago and since then has been sleeping all the time, has had increased swelling in his abdomen, arms and feet.

## 2020-09-10 NOTE — PALLIATIVE CARE NOTE
[facility]  Palliative Care Follow Up    Dsetiney Handy Patient Status:  Inpatient    1926 MRN [id number]   Location [facility] 3W/SW Attending Corine Hendricks MD   Hosp Day # 2 PCP Brooks Tapia MD     Date of Consult:  mL, 3 mL, Intravenous, PRN  •  nitroGLYCERIN (NITROSTAT) SL tab 0.4 mg, 0.4 mg, Sublingual, Q5 Min PRN  •  0.9% NaCl infusion, , Intravenous, Once  •  acetaminophen (TYLENOL) tab 650 mg, 650 mg, Oral, Q6H PRN  •  morphINE sulfate (PF) 2 MG/ML injection 1 m intracranial process. 2.  There are mild microvascular white matter ischemic changes, likely related to long-standing hypertension and/or diabetes. 3.  Atherosclerosis in the anterior and posterior circulations.   Dictated by (CST): Shira Menon MD on 9/08/ Treatment  -Continue supportive medical treatments  -[facility]idential H[facility] meeting planned for tomorrow (Friday, September 11, 2020) at 2:30 pm  -See above narrative for further details      Advance care planning counseling/discussion  -Order for DNAR/Select

## 2020-09-10 NOTE — PLAN OF CARE
Problem: Patient Centered Care  Goal: Patient preferences are identified and integrated in the patient's plan of care  Description  Interventions:  - What would you like us to know as we care for you? I have 3 daughters.   - Provide timely, complete, and cardiac arrhythmias or at baseline  Description  INTERVENTIONS:  - Continuous cardiac monitoring, monitor vital signs, obtain 12 lead EKG if indicated  - Evaluate effectiveness of antiarrhythmic and heart rate control medications as ordered  - Initiate dimas

## 2020-09-10 NOTE — OCCUPATIONAL THERAPY NOTE
OCCUPATIONAL THERAPY EVALUATION - INPATIENT     Room Number: 320/320-A  Evaluation Date: 9/10/2020  Type of Evaluation: Initial       Physician Order: IP Consult to Occupational Therapy  Reason for Therapy: ADL/IADL Dysfunction and Discharge Planning    OC family and patient decide on rehab; although uncertain of overall prognosis if he is appropriate for hospice care (tentative meeting scheduled tomorrow afternoon).  I wonder if there is also a component of depression as patient's daughter reports patient lo ([facility] 7/18/2020   • B12 deficiency 2012   • Closed fracture of multiple ribs of right side with routine healing 2/11/2019   • Disorder of coccyx 5/20/2013   • Fracture of arm 1979   • Hearing loss    • [facility]   • Heart attack ([facility] 11/19/2 ASSESSMENT  AM-PAC ‘6-Clicks’ Inpatient Daily Activity Short Form  How much help from another person does the patient currently need…  -   Putting on and taking off regular lower body clothing?: A Lot  -   Bathing (including washing, rinsing, drying)?: A L

## 2020-09-10 NOTE — PLAN OF CARE
Patient lethargic most of day; not very big appetite; able to stand for othostatic blood pressure reading with 3 standby assists and walker. 2 of 3 daughters bedside today--Ale in morning; Mike Johnston in evening.  Mike Roles stated all sisters can know information Monitor vital signs, rhythm, and trends  - Monitor for bleeding, hypotension and signs of decreased cardiac output  - Evaluate effectiveness of vasoactive medications to optimize hemodynamic stability  - Monitor arterial and/or venous puncture sites for bl part  Description  INTERVENTIONS:  - Support and protect limb and body alignment per provider's orders  - Instruct and reinforce with patient and family use of appropriate assistive device and precautions (e.g. spinal or hip dislocation precautions)  Alvaro Oconnor

## 2020-09-10 NOTE — PHYSICAL THERAPY NOTE
PHYSICAL THERAPY EVALUATION - INPATIENT     Room Number: 320/320-A  Evaluation Date: 9/10/2020  Type of Evaluation: Initial   Physician Order: PT Eval and Treat    Presenting Problem: Weakness   Reason for Therapy: Mobility Dysfunction and Discharge Plann arrhythmia    Essential hypertension    Impaired fasting glucose    Stage 3 chronic kidney disease (HCC)    Dilated cardiomyopathy (HCC)    Hyperthyroidism    Chronic systolic congestive heart failure (HCC)    Cirrhosis of liver with ascites ([facility]    Shelly Walton OBJECTIVE  Precautions: Bed/chair alarm  Fall Risk: Standard fall risk    WEIGHT BEARING RESTRICTION  Weight Bearing Restriction: None                PAIN ASSESSMENT  Ratin  Location: R shoulder and ribs r flank  Management Techniques:  Activity pro Transfers: Mod A with RW    Exercise/Education Provided:  Bed mobility  Body mechanics  Energy conservation  Functional activity tolerated  Posture  ROM  Strengthening  Lower therapeutic exercise:   Ankle pumps  Heel slides  LAQ  Transfer training    Pa

## 2020-09-10 NOTE — PROGRESS NOTES
[facility]    Progress Note    Carmen Le Patient Status:  Inpatient    1926 MRN [id number]   Location [facility] 3W/SW Attending Jacky Wilks MD   Hosp Day # 2 PCP Lauryn Borrero MD        Subjective:   Subjective: -Stable       Falls, sequela -with a shoulder dislocation and rib fractures  Cont sling with activity    DVT prophylaxis     Plan  CPM   Stop ABX in AM   Hospice meeting tomorrow   Hopefully dc  Tomorrow with home hospice   Code DNR         Results:     La inferior-apical infarct.  ABNORMAL When compared with ECG of 09/08/2020 19:57:52 No significant changes have occurred Electronically signed on 09/09/2020 at 17:47 by Kira Bolden MD    Ekg 12-lead    Result Date: 9/8/2020  ECG Report  Interpretation  -------

## 2020-09-10 NOTE — H&P
[facility]    History and Physical    Dinora Munoz Patient Status:  Inpatient    1926 MRN [id number]   Location [facility] 3W/SW Attending Lina Hunter MD   Hosp Day # 1 PCP Aj aMya MD     Date:  2020  Date of Heart attack ([facility] [address] 11/19/2018   • Heart disease    • History of measles    • History of mumps    • Subjective tinnitus 2/9/2013   • Urinary tract infection with hematuria 12/6/2018     Past Surgical History:   Procedure Laterality Date   • ANGIOPLASTY ([facility] Musculoskeletal: Positive for joint pain and gait problem. Neurological: Positive for weakness. Negative for dizziness, numbness and headaches. Psychiatric/Behavioral: Negative for behavioral problems and agitation.        Physical Exam:   Vital Signs microvascular white matter ischemic changes, likely related to long-standing hypertension and/or diabetes. 3.  Atherosclerosis in the anterior and posterior circulations.   Dictated by (CST): Krishna Fenton MD on 9/08/2020 at 8:26 PM     Finalized by (CST): TAMMIE decided to monitor      Dementia without behavioral disturbance (HCC)   -Stable      Falls, sequela -with a shoulder dislocation and rib fractures continue with the sling.   Encourage patient to take deep breaths    DVT prophylaxis    Plan  Appreciate palli

## 2020-09-11 NOTE — PALLIATIVE CARE NOTE
[facility]  Palliative Care Follow Up    Yehuda Mancera Patient Status:  Inpatient    1926 MRN [id number]   Location [facility] 3W/SW Attending Maurisio Donnelly MD   Hosp Day # 3 PCP Chi Balderas MD     Date of Consult:  Pantoprazole Sodium (PROTONIX) EC tab 40 mg, 40 mg, Oral, QAM AC  •  Normal Saline Flush 0.9 % injection 3 mL, 3 mL, Intravenous, PRN  •  nitroGLYCERIN (NITROSTAT) SL tab 0.4 mg, 0.4 mg, Sublingual, Q5 Min PRN  •  0.9% NaCl infusion, , Intravenous, Once  • 09/09/2020       Imaging:  No results found.     Palliative Performance Scale : 40%    Advance Directives:  Have advanced directives been discussed with patient or healthcare power of : Yes        Healthcare Agent Appointed: Yes  Healthcare Agent's consents  -#1 Toni Joseph is daughter Rohit Alex form is in [address] Krystal Adams is in [employer]  -See above narrative for further details      Palliative Performance Scale 40%    Palliative Care Follow-up:  I spent a total of 75 minutes with the patient today, which include

## 2020-09-11 NOTE — CM/SW NOTE
[facility] - pt will d/c home on hospice services on Sunday 9/13/2020. Pt will d/c home w/ dtr Karissa Gao.     PLAN: Home w/ Residential Hospice services on Sunday 9/13    SW/CM to remain available for support and/or discha

## 2020-09-11 NOTE — CM/SW NOTE
Hospice Team met with pts dtrs, Mandy Rodriguez, Afua Diaz and and Melony Valdes to discuss hospice options for pt. Hospice explanation given all questions were answered; advised pt is appropriate for routine level of care.   DtrAle agreeable to take pt home with he

## 2020-09-11 NOTE — PLAN OF CARE
Patient not able to urinate today. Bladder scan > 400. Stack catheter inserted. Meeting with Hospice planned for tomorrow. Up to the chair for 3-4 hours today.      Problem: Patient Centered Care  Goal: Patient preferences are identified and integrated in t Assess for signs of decreased coronary artery perfusion - ex.  Angina  - Evaluate fluid balance, assess for edema, trend weights  Outcome: Progressing  Goal: Absence of cardiac arrhythmias or at baseline  Description  INTERVENTIONS:  - Continuous cardiac mo

## 2020-09-11 NOTE — PLAN OF CARE
Patient alert, lying in bed resting. No acute events overnight. Plan is for hospice meeting today with family. Bed alarm on call light within reach. Will continue to monitor.    Problem: Patient Centered Care  Goal: Patient preferences are identified and in temperature  - Assess for signs of decreased coronary artery perfusion - ex.  Angina  - Evaluate fluid balance, assess for edema, trend weights  Outcome: Progressing  Goal: Absence of cardiac arrhythmias or at baseline  Description  INTERVENTIONS:  - Contin

## 2020-09-11 NOTE — PROGRESS NOTES
[facility]    Progress Note    Marcus Perez Patient Status:  Inpatient    1926 MRN [id number]   Location [facility] 3W/SW Attending Santhosh Steel MD   Hosp Day # 3 PCP Christiano Aldana MD        Subjective:   Subjective: prophylaxis     Plan  Appreciate palliative care and hospice team effort. Plan for home hospice. Plan for deactivating AICD prior to discharge  Discharge on Sunday with home hospice after everything is set up at home.       Results:     Lab Results   Comp

## 2020-09-12 NOTE — HOSPICE RN NOTE
Anticipating discharge home tomorrow. DME and medications ordered. Hospice services are to start 9/13/2020. Consents signed. Plan of care discussed with [facility] RN.

## 2020-09-12 NOTE — PROGRESS NOTES
[facility]    Progress Note    Katherine Ceballos Patient Status:  Inpatient    1926 MRN [id number]   Location [facility] 3W/SW Attending Piper Worrell MD   Hosp Day # 4 PCP Poncho Fernandez MD        Subjective:     Unable to intake      Cirrhosis of liver with ascites    -was seen by gastroenterology in the past decided to monitor       Dementia without behavioral disturbance    -Stable       Falls, sequela -with a shoulder dislocation and rib fractures  Cont sling with Janie Nickel

## 2020-09-12 NOTE — PLAN OF CARE
Patient resting in bed. Confused. Orientedx1-2. On room air. Frequent repositioning. Hospice meeting with family today - plan for discharge on Sunday. Fall precautions in place. Call light in reach.    Problem: Patient Centered Care  Goal: Patient preferenc pulses, skin color and temperature  - Assess for signs of decreased coronary artery perfusion - ex.  Angina  - Evaluate fluid balance, assess for edema, trend weights  Outcome: Progressing  Goal: Absence of cardiac arrhythmias or at baseline  Description  I

## 2020-09-12 NOTE — CM/SW NOTE
MSW arranged for 3pm transport via [address] with [facility], as pt is on room air and able to sit in chair. PCS form to de done by this writer.   BYRON Hernandez  [facility]  [phone]

## 2020-09-13 NOTE — DISCHARGE SUMMARY
[facility]    Discharge Summary    Marcellus Pretty Patient Status:  Inpatient    1926 MRN [id number]   Location [facility] 3W/SW Attending No att. providers found   Hosp Day # 5 PCP Regina Hooks MD     Date of Admission:  performance status, hypertension, was brought into the hospital because of increased weakness and declining performance status. Hospital Course:   Weakness generalized/failure to thrive with multiple falls with poor quality of life.   -seen by [facility]              Discharge Plan:   Discharge Medications:      Discharge Medications      START taking these medications      Instructions Prescription details   acetaminophen 325 MG Tabs  Commonly known as:  TYLENOL      Take 2 tablets (962

## 2020-09-13 NOTE — PLAN OF CARE
Problem: CARDIOVASCULAR - ADULT  Goal: Maintains optimal cardiac output and hemodynamic stability  Description  INTERVENTIONS:  - Monitor vital signs, rhythm, and trends  - Monitor for bleeding, hypotension and signs of decreased cardiac output  - Evalua activity level and limitations with patient/family  Outcome: Progressing  Goal: Maintain proper alignment of affected body part  Description  INTERVENTIONS:  - Support and protect limb and body alignment per provider's orders  - Instruct and reinforce with

## 2020-09-13 NOTE — PROGRESS NOTES
[facility]    BOUNDARY [facility] [employer] ICD tachy therapies programmed OFF. Going home to Hospice today. Reviewed with patient and daughter both who are in agreement as planned. [employer] notiefied as well.     Leticia Ocasio NP

## 2020-09-13 NOTE — HOSPICE RN NOTE
Rounded on pt, he is stable for discharge home via [address] [employer]. Pt will be admitted to hospice once he is home. Discussed POC with SHYAM Bryson.

## 2020-09-13 NOTE — PROGRESS NOTES
Pt A/O, RA, denies pain and shortness of breath. Pt to be discharged home with hospice today as ordered. Discharge instructions, medications/side effects, prescriptions, and follow up appointments reviewed with pt and daughter.  Pt and daughter verbalized u

## 2020-09-14 NOTE — TELEPHONE ENCOUNTER
Patient has a normal TSH level ( the hormone that controls thyroid hormoe production and is the mpst sensitive marker of thyroid function)  His T4 ( the hormone level) itself is still high and has been going up  Since TSH is normal, and  he does not have a

## 2020-09-18 NOTE — TELEPHONE ENCOUNTER
Attempted to contact Demetrio Epperson, patient daughter (on [address] [place]):    Bobby Solano

## 2021-12-16 NOTE — PROGRESS NOTES
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              Comments     1 Week PO   Using:  Pred 1% QID right eye   - Oflox QID right eye   - Erythromycin ointment QID right eye  - PFATS 4-6 times a day each eye, Pt states he has not used since surgery last roxane    Akanksha Jeyson COT 8:59 AM December 16, 2021                      [facility] - Gastroenterology                                                                                                               Reason for consult: hospital f/u    Requesting physician or provider: Diana Escalera change in appetite and/or unintentional weight loss.       NSAIDS: no  Tobacco: no  Alcohol: no  Illicit drugs: no    No FH GI malignancy    Last colonoscopy: never  Last EGD: never    Wt Readings from Last 6 Encounters:  08/10/20 : 181 lb (82.1 kg)  08/03/ PLATINUM SILVER) Oral Chew Tab Chew 1 tablet by mouth daily. • amiodarone HCl 200 MG Oral Tab Take 1 tablet (200 mg total) by mouth daily.  90 tablet 1   • atorvastatin 20 MG Oral Tab   3   • carvedilol (COREG) 6.25 MG Oral Tab Take 6.25 mg by mouth 2 ( renal cortical cyst.  3. Normal retrocecal appendix.  Colonic diverticulosis without diverticulitis  4. Cardiomegaly.  Mild pericardial effusion.  Small right pleural effusion. 5. Gynecomastia partially visualized.   6. S-shaped scoliosis dorsal lumbar spi Antigen  Nonreactive  Nonreactive     Hepatitis B Core Ab,Total  Nonreactive  Nonreactive     Hepatitis B Surface Antibody  Nonreactive  Nonreactive     Heptatitis B Surface Ab Quant  mIU/mL <3.10      Hepatitis C Virus  Nonreactive  Nonreactive      [place] like to pursue egd  -consider paracentesis    daughter Yennifer Liz [phone]  Orders This Visit:  Orders Placed This Encounter      CBC W Differential W Platelet      Hepatic Function Panel      Meds This Visit:  Requested Prescriptions      No prescriptio

## 2024-10-29 NOTE — ED PROVIDER NOTES
Patient Seen in: [address]      History   Patient presents with:  Swelling Edema    Stated Complaint: swelling    HPI    The patient is a 80-year-old male with past history of coronary artery disease, status post CABG wh HPI.  Constitutional and vital signs reviewed. All other systems reviewed and negative except as noted above.     Physical Exam     ED Triage Vitals [08/29/20 1128]   /64   Pulse 72   Resp 18   Temp 97.9 °F (36.6 °C)   Temp src Temporal   SpO2 10 not wrapping the arm any further. Possible bursitis. Recommend follow-up with primary MD for any worsening symptoms. MDM     Bursitis versus DVT versus swelling related to placement of Ace wrap yesterday.               Disposition and Plan     Clin Quality 47: Advance Care Plan: Advance Care Planning discussed and documented; advance care plan or surrogate decision maker documented in the medical record. Detail Level: Detailed Quality 226: Preventive Care And Screening: Tobacco Use: Screening And Cessation Intervention: Patient screened for tobacco use and is an ex/non-smoker

## (undated) DEVICE — CYSTO PACK: Brand: MEDLINE INDUSTRIES, INC.

## (undated) DEVICE — Device

## (undated) DEVICE — BAG DRAIN INFECTION CNTRL 2000

## (undated) DEVICE — SOLO HYBRID WIRE 35 FLEX STR

## (undated) DEVICE — PUMP SAPS 1  ACT 1 WY VLV

## (undated) DEVICE — SOLO FLEX HYBRID GUIDEWIRE .03

## (undated) DEVICE — PLASTC TOOMEY SYRNG DISP

## (undated) DEVICE — DILATOR/SHEATH SET: Brand: 8/10 DILATOR/SHEATH SET

## (undated) DEVICE — SOL  .9 3000ML

## (undated) DEVICE — SOL  .9 1000ML BTL

## (undated) DEVICE — ISOVUE 300 10X100ML VIAL

## (undated) DEVICE — ENDOSCOPIC VALVE WITH ADAPTER.: Brand: SURSEAL® II

## (undated) DEVICE — ENCORE® LATEX ACCLAIM SIZE 8, STERILE LATEX POWDER-FREE SURGICAL GLOVE: Brand: ENCORE

## (undated) DEVICE — CATH URET CONE TIP 8FR 138008

## (undated) DEVICE — DUAL LUMEN CATHETER

## (undated) DEVICE — TIGERTAIL 6F FLXTIP 70CM

## (undated) NOTE — IP AVS SNAPSHOT
[facility]            (For Outpatient Use Only) Initial Admit Date: 9/8/2020   Inpt/Obs Admit Date: Inpt: 9/8/20 / Obs: N/A   Discharge Date:    Phil Garcia:  [de-identified]   MRN: [de-identified]   CSN: [id number]   CEID: [id number]        [facility] Subscriber Name:  Shirin Guadalupe :    Subscriber ID:  Pt Rel to Subscriber:    Hospital Account Financial Class: Medicare    2020

## (undated) NOTE — ED AVS SNAPSHOT
Chaya Lomas   MRN: [id number]    Department:  [facility] Emergency Department   Date of Visit:  10/20/2018           Disclosure     Insurance plans vary and the physician(s) referred by the ER may not be covered by your plan.  Please contact yo within the next three months to obtain basic health screening including reassessment of your blood pressure.     IF THERE IS ANY CHANGE OR WORSENING OF YOUR CONDITION, CALL YOUR PRIMARY CARE PHYSICIAN AT ONCE OR RETURN IMMEDIATELY TO THE EMERGENCY DEPARTMEN

## (undated) NOTE — Clinical Note
Thank you for the consult. I saw Mr. Heather Quezada in the endocrine/diabetes clinic today. Please see attached my note. Please feel free to contact me with any questions. Thanks!

## (undated) NOTE — MR AVS SNAPSHOT
[address] [facility] [address]  [phone]               Thank you for choosing us for your health care visit with Madhu Mckay MD.  We are glad to serve you and happy to provide you with this summary of your visit. Commonly known as:  PRINIVIL,ZESTRIL           losartan 100 MG Tabs   Take 1 tablet (100 mg total) by mouth daily.    Commonly known as:  COZAAR           Mometasone Furo-Formoterol Fum 200-5 MCG/ACT Aero   Inhale 2 puffs twice daily   Commonly known as:  D Tips for increasing your physical activity – Adults who are physically active are less likely to develop some chronic diseases than adults who are inactive.      HOW TO GET STARTED: HOW TO STAY MOTIVATED:   Start activities slowly and build up over time Do

## (undated) NOTE — LETTER
[facility]  Administration of Anesthesia  1. Doroteo Santana, or _________________________________ acting on his behalf, (Patient) (Dependent/Representative) request to receive anesthesia for my pending procedure/operation/treatment.   A ph infections, high spinal block, spinal bleeding, seizure, cardiac arrest and death. 7. AWARENESS: I understand that it is possible (but unlikely) to have explicit memory of events from the operating room while under general anesthesia.   8. ELECTROCONVULSIV unconscious pt /Relationship    My signature below affirms that prior to the time of the procedure, I have explained to the patient and/or his/her guardian, the risks and benefits of undergoing anesthesia, as well as any reasonable alternatives.     _______

## (undated) NOTE — ED AVS SNAPSHOT
Tabitha Damico   MRN: [id number]    Department:  [facility] Emergency Department   Date of Visit:  2/7/2019           Disclosure     Insurance plans vary and the physician(s) referred by the ER may not be covered by your plan.  Please contact your within the next three months to obtain basic health screening including reassessment of your blood pressure.     IF THERE IS ANY CHANGE OR WORSENING OF YOUR CONDITION, CALL YOUR PRIMARY CARE PHYSICIAN AT ONCE OR RETURN IMMEDIATELY TO THE EMERGENCY DEPARTMEN

## (undated) NOTE — ED AVS SNAPSHOT
Destiney Handy   MRN: [id number]    Department:  [facility] Emergency Department   Date of Visit:  11/30/2018           Disclosure     Insurance plans vary and the physician(s) referred by the ER may not be covered by your plan.  Please contact yo within the next three months to obtain basic health screening including reassessment of your blood pressure.     IF THERE IS ANY CHANGE OR WORSENING OF YOUR CONDITION, CALL YOUR PRIMARY CARE PHYSICIAN AT ONCE OR RETURN IMMEDIATELY TO THE EMERGENCY DEPARTMEN

## (undated) NOTE — LETTER
[address] [place] [address]      Authorization for Surgical Operation and Procedure     Date:___________                                                                                                         Time:_______ and/or blood products. The following are some, but not all, of the potential risks that can occur: fever and allergic reactions, hemolytic reactions, transmission of diseases such as Hepatitis, AIDS and Cytomegalovirus (CMV) and fluid overload.   In the ev (or a person authorized to consent on my behalf). The surgeon or my attending physician will determine when the applicable recovery period ends for purposes of reinstating the DNAR order.   10. Patients having a sterilization procedure: I understand that if procedure/surgery, I have disclosed this and had a discussion with my patient.     _______________________________________________________________ _____________________________  Neel Gomez Physician)

## (undated) NOTE — ED AVS SNAPSHOT
Daisy Boles   MRN: [id number]    Department:  [facility] Emergency Department   Date of Visit:  3/18/2018           Disclosure     Insurance plans vary and the physician(s) referred by the ER may not be covered by your plan.  Please contact you within the next three months to obtain basic health screening including reassessment of your blood pressure.     IF THERE IS ANY CHANGE OR WORSENING OF YOUR CONDITION, CALL YOUR PRIMARY CARE PHYSICIAN AT ONCE OR RETURN IMMEDIATELY TO THE EMERGENCY DEPARTMEN

## (undated) NOTE — ED AVS SNAPSHOT
Nuris Sin   MRN: [id number]    Department:  [facility] Emergency Department   Date of Visit:  2/6/2019           Disclosure     Insurance plans vary and the physician(s) referred by the ER may not be covered by your plan.  Please contact your within the next three months to obtain basic health screening including reassessment of your blood pressure.     IF THERE IS ANY CHANGE OR WORSENING OF YOUR CONDITION, CALL YOUR PRIMARY CARE PHYSICIAN AT ONCE OR RETURN IMMEDIATELY TO THE EMERGENCY DEPARTMEN